# Patient Record
Sex: FEMALE | Race: WHITE | NOT HISPANIC OR LATINO | Employment: OTHER | URBAN - METROPOLITAN AREA
[De-identification: names, ages, dates, MRNs, and addresses within clinical notes are randomized per-mention and may not be internally consistent; named-entity substitution may affect disease eponyms.]

---

## 2017-09-20 LAB
CHOLEST SERPL-MSCNC: 222 MG/DL (ref 0–200)
HDLC SERPL-MCNC: 63 MG/DL
LDL/HDL RATIO: 1.9
LDLC SERPL CALC-MCNC: 121 MG/DL (ref 0–160)
TRIGLYCERIDE (LIPID PAN): 190
VLDLC SERPL-MCNC: 38 MG/DL

## 2017-12-18 ENCOUNTER — OFFICE VISIT (OUTPATIENT)
Dept: FAMILY MEDICINE | Facility: CLINIC | Age: 42
End: 2017-12-18
Payer: MEDICARE

## 2017-12-18 VITALS
DIASTOLIC BLOOD PRESSURE: 62 MMHG | BODY MASS INDEX: 22.34 KG/M2 | HEIGHT: 65 IN | WEIGHT: 134.06 LBS | OXYGEN SATURATION: 97 % | SYSTOLIC BLOOD PRESSURE: 110 MMHG | HEART RATE: 98 BPM | TEMPERATURE: 99 F

## 2017-12-18 DIAGNOSIS — G89.29 CHRONIC BACK PAIN, UNSPECIFIED BACK LOCATION, UNSPECIFIED BACK PAIN LATERALITY: Primary | ICD-10-CM

## 2017-12-18 DIAGNOSIS — M54.9 CHRONIC BACK PAIN, UNSPECIFIED BACK LOCATION, UNSPECIFIED BACK PAIN LATERALITY: Primary | ICD-10-CM

## 2017-12-18 PROCEDURE — 99999 PR PBB SHADOW E&M-NEW PATIENT-LVL IV: CPT | Mod: PBBFAC,,, | Performed by: NURSE PRACTITIONER

## 2017-12-18 PROCEDURE — 99203 OFFICE O/P NEW LOW 30 MIN: CPT | Mod: S$GLB,,, | Performed by: NURSE PRACTITIONER

## 2017-12-18 PROCEDURE — 99204 OFFICE O/P NEW MOD 45 MIN: CPT | Mod: PBBFAC,PO | Performed by: NURSE PRACTITIONER

## 2017-12-18 RX ORDER — OLANZAPINE 5 MG/1
5 TABLET ORAL NIGHTLY
COMMUNITY
End: 2018-01-30 | Stop reason: SDUPTHER

## 2017-12-18 RX ORDER — MELOXICAM 15 MG/1
15 TABLET ORAL DAILY
COMMUNITY
End: 2018-01-30 | Stop reason: SDUPTHER

## 2017-12-18 RX ORDER — BACLOFEN 20 MG/1
20 TABLET ORAL 3 TIMES DAILY
COMMUNITY
End: 2018-01-30 | Stop reason: SDUPTHER

## 2017-12-18 RX ORDER — CLONAZEPAM 0.5 MG/1
0.5 TABLET ORAL 2 TIMES DAILY PRN
COMMUNITY
End: 2018-01-30

## 2017-12-18 RX ORDER — ROSUVASTATIN CALCIUM 20 MG/1
20 TABLET, COATED ORAL DAILY
COMMUNITY
End: 2018-01-30 | Stop reason: SDUPTHER

## 2017-12-18 RX ORDER — PREGABALIN 50 MG/1
50 CAPSULE ORAL 2 TIMES DAILY
COMMUNITY
End: 2018-01-30

## 2017-12-18 RX ORDER — GABAPENTIN 300 MG/1
300 CAPSULE ORAL 3 TIMES DAILY
COMMUNITY
End: 2018-01-30 | Stop reason: SDUPTHER

## 2017-12-18 RX ORDER — VENLAFAXINE HYDROCHLORIDE 150 MG/1
75 CAPSULE, EXTENDED RELEASE ORAL 2 TIMES DAILY
COMMUNITY
End: 2018-01-30

## 2017-12-18 RX ORDER — BUPRENORPHINE 20 UG/H
20 PATCH TRANSDERMAL
COMMUNITY
End: 2018-01-30

## 2017-12-18 RX ORDER — DULOXETIN HYDROCHLORIDE 60 MG/1
60 CAPSULE, DELAYED RELEASE ORAL DAILY
COMMUNITY
End: 2018-01-30 | Stop reason: DRUGHIGH

## 2017-12-18 RX ORDER — SERTRALINE HYDROCHLORIDE 100 MG/1
100 TABLET, FILM COATED ORAL 2 TIMES DAILY
COMMUNITY
End: 2018-01-30 | Stop reason: DRUGHIGH

## 2017-12-18 NOTE — PATIENT INSTRUCTIONS
Back Pain (Acute or Chronic)    Back pain is one of the most common problems. The good news is that most people feel better in 1 to 2 weeks, and most of the rest in 1 to 2 months. Most people can remain active.  People experience and describe pain differently; not everyone is the same.  · The pain can be sharp, stabbing, shooting, aching, cramping or burning.  · Movement, standing, bending, lifting, sitting, or walking may worsen pain.  · It can be localized to one spot or area, or it can be more generalized.  · It can spread or radiate upwards, to the front, or go down your arms or legs (sciatica).  · It can cause muscle spasm.  Most of the time, mechanical problems with the muscles or spine cause the pain. Mechanical problems are usually caused by an injury to the muscles or ligaments. While illness can cause back pain, it is usually not caused by a serious illness. Mechanical problems include:   · Physical activity such as sports, exercise, work, or normal activity  · Overexertion, lifting, pushing, pulling incorrectly or too aggressively  · Sudden twisting, bending, or stretching from an accident, or accidental movement  · Poor posture  · Stretching or moving wrong, without noticing pain at the time  · Poor coordination, lack of regular exercise (check with your doctor about this)  · Spinal disc disease or arthritis  · Stress  Pain can also be related to pregnancy, or illness like appendicitis, bladder or kidney infections, pelvic infections, and many other things.  Acute back pain usually gets better in 1 to 2 weeks. Back pain related to disk disease, arthritis in the spinal joints or spinal stenosis (narrowing of the spinal canal) can become chronic and last for months or years.  Unless you had a physical injury (for example, a car accident or fall) X-rays are usually not needed for the initial evaluation of back pain. If pain continues and does not respond to medical treatment, X-rays and other tests may be  needed.  Home care  Try these home care recommendations:  · When in bed, try to find a position of comfort. A firm mattress is best. Try lying flat on your back with pillows under your knees. You can also try lying on your side with your knees bent up towards your chest and a pillow between your knees.  · At first, do not try to stretch out the sore spots. If there is a strain, it is not like the good soreness you get after exercising without an injury. In this case, stretching may make it worse.  · Avoid prolong sitting, long car rides, or travel. This puts more stress on the lower back than standing or walking.  · During the first 24 to 72 hours after an acute injury or flare up of chronic back pain, apply an ice pack to the painful area for 20 minutes and then remove it for 20 minutes. Do this over a period of 60 to 90 minutes or several times a day. This will reduce swelling and pain. Wrap the ice pack in a thin towel or plastic to protect your skin.  · You can start with ice, then switch to heat. Heat (hot shower, hot bath, or heating pad) reduces pain and works well for muscle spasms. Heat can be applied to the painful area for 20 minutes then remove it for 20 minutes. Do this over a period of 60 to 90 minutes or several times a day. Do not sleep on a heating pad. It can lead to skin burns or tissue damage.  · You can alternate ice and heat therapy. Talk with your doctor about the best treatment for your back pain.  · Therapeutic massage can help relax the back muscles without stretching them.  · Be aware of safe lifting methods and do not lift anything without stretching first.  Medicines  Talk to your doctor before using medicine, especially if you have other medical problems or are taking other medicines.  · You may use over-the-counter medicine as directed on the bottle to control pain, unless another pain medicine was prescribed. If you have chronic conditions like diabetes, liver or kidney disease,  stomach ulcers, or gastrointestinal bleeding, or are taking blood thinners, talk to your doctor before taking any medicine.  · Be careful if you are given a prescription medicines, narcotics, or medicine for muscle spasms. They can cause drowsiness, affect your coordination, reflexes, and judgement. Do not drive or operate heavy machinery.  Follow-up care  Follow up with your healthcare provider, or as advised.   A radiologist will review any X-rays that were taken. Your provide will notify you of any new findings that may affect your care.  Call 911  Call emergency services if any of the following occur:  · Trouble breathing  · Confusion  · Very drowsy or trouble awakening  · Fainting or loss of consciousness  · Rapid or very slow heart rate  · Loss of bowel or bladder control  When to seek medical advice  Call your healthcare provider right away if any of these occur:   · Pain becomes worse or spreads to your legs  · Weakness or numbness in one or both legs  · Numbness in the groin or genital area  Date Last Reviewed: 7/1/2016  © 7304-0784 [employer]. [address] [place] [address], [place] [address]. All rights reserved. This information is not intended as a substitute for professional medical care. Always follow your healthcare professional's instructions.

## 2017-12-18 NOTE — PROGRESS NOTES
Subjective:       Patient ID: Alley Chamorro is a 42 y.o. female.  First time seeing pt in clinic.  New to  [facility].   Reviewed pt's medical, surgical, social, and family history.     Chief Complaint: Establish Care (needsl referral for neurosurgen)    Pt here to establish care with a new doctor.   She was last seen by Dr Campbell in [place] and Dr Larios in [place].  She just recently moved to [place] about 2 months ago.   Pt reports, she doesn't want to go to pain management, but request a refill of her pain med patch.  She also reports she doesn't want to have anymore steroid injections.             Vitals:    12/18/17 0850   BP: 110/62   Pulse: 98   Temp: 98.6 °F (37 °C)     Past Medical History:   Diagnosis Date    Anxiety     Chronic back pain     Depression     Rheumatoid arthritis     Scoliosis      Review of Systems   Constitutional: Negative for chills, diaphoresis and fever.   HENT: Negative for congestion, ear discharge, ear pain, postnasal drip, rhinorrhea, sinus pain, sinus pressure, sneezing, sore throat and voice change.    Eyes: Negative for visual disturbance.   Respiratory: Negative for cough, chest tightness and shortness of breath.    Cardiovascular: Negative for chest pain.   Gastrointestinal: Negative for abdominal pain, constipation, diarrhea, nausea and vomiting.   Genitourinary: Negative for difficulty urinating, dysuria, frequency, hematuria and urgency.   Musculoskeletal: Positive for back pain.        Chronic back pain since 2006. 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 She reports multiple bulging disc hx.        Objective:      Physical Exam   Constitutional: She is oriented to person, place, and time. Vital signs are normal. She appears well-developed and well-nourished. She is cooperative.   HENT:   Head: Normocephalic and atraumatic.   Right Ear: Hearing, tympanic membrane, external ear and ear canal normal.   Left Ear: Hearing, tympanic membrane,  external ear and ear canal normal.   Nose: Nose normal.   Mouth/Throat: Uvula is midline, oropharynx is clear and moist and mucous membranes are normal. Abnormal dentition. Dental caries present.   Eyes: Conjunctivae, EOM and lids are normal. Pupils are equal, round, and reactive to light.   Neck: Trachea normal and normal range of motion. Neck supple.   Cardiovascular: Normal rate, regular rhythm, S1 normal, S2 normal, normal heart sounds, intact distal pulses and normal pulses.    Pulses:       Posterior tibial pulses are 2+ on the right side, and 2+ on the left side.   Pulmonary/Chest: Effort normal and breath sounds normal. No respiratory distress.   Abdominal: Soft. Bowel sounds are normal. There is no tenderness. There is no tenderness at McBurney's point and negative Yap's sign.   Musculoskeletal:        Lumbar back: She exhibits tenderness.   Lymphadenopathy:        Head (right side): No submental, no submandibular, no tonsillar, no preauricular, no posterior auricular and no occipital adenopathy present.        Head (left side): No submental, no submandibular, no tonsillar, no preauricular, no posterior auricular and no occipital adenopathy present.     She has no cervical adenopathy.   Neurological: She is alert and oriented to person, place, and time.   Skin: Skin is warm, dry and intact.   Psychiatric: She has a normal mood and affect. Her speech is normal and behavior is normal. Judgment and thought content normal. Cognition and memory are normal.   Nursing note and vitals reviewed.      Assessment & Plan:       Chronic back pain, unspecified back location, unspecified back pain laterality    Pt reports her last MRI was within a year. Pt signed medical release, to get a copy of her records from previous providers.   Pt to schedule appointment to establish care with a PCP for further evaluation and treatment for chronic back pain.   Pt declined pain management at this time.       Return if symptoms  worsen or fail to improve.

## 2018-01-16 ENCOUNTER — PATIENT OUTREACH (OUTPATIENT)
Dept: ADMINISTRATIVE | Facility: HOSPITAL | Age: 43
End: 2018-01-16

## 2018-01-16 NOTE — LETTER
January 16, 2018    Alley Chamorro  [address] [place] [address]             [facility]  [address]  [facility] [address]  Phone: [phone] Dear Mrs. Chamorro:    [facility] is committed to your overall health.  To help you get the most out of each of your visits, we will review your information to make sure you are up to date on all of your recommended tests and/or procedures.      As a new patient to Dr. Renate De La O, we may not have your complete medical records.  She has found that your chart shows you may be due for a mammogram and possibly some immunizations (tetanus, pneumonia, and flu).     Medicare does not cover all immunizations to be given in the clinic.  Check your benefits to ensure that you do not need to receive your immunizations at the pharmacy.    If you have had any of the above done at another facility, please bring the records or information with you so that your record at [facility] will be complete.      If you are currently taking medication, please bring it with you to your appointment for review.    If you have any questions or concerns, please don't hesitate to call.    Thank you for letting us care for you,  Katharine Ruffin LPN Clinical Care Coordinator  [facility]  [phone]

## 2018-01-30 ENCOUNTER — OFFICE VISIT (OUTPATIENT)
Dept: FAMILY MEDICINE | Facility: CLINIC | Age: 43
End: 2018-01-30
Payer: MEDICARE

## 2018-01-30 VITALS
SYSTOLIC BLOOD PRESSURE: 112 MMHG | RESPIRATION RATE: 16 BRPM | WEIGHT: 133.81 LBS | TEMPERATURE: 99 F | HEIGHT: 65 IN | HEART RATE: 90 BPM | BODY MASS INDEX: 22.3 KG/M2 | DIASTOLIC BLOOD PRESSURE: 82 MMHG | OXYGEN SATURATION: 98 %

## 2018-01-30 DIAGNOSIS — M54.42 CHRONIC BILATERAL LOW BACK PAIN WITH BILATERAL SCIATICA: ICD-10-CM

## 2018-01-30 DIAGNOSIS — Z12.31 ENCOUNTER FOR SCREENING MAMMOGRAM FOR MALIGNANT NEOPLASM OF BREAST: ICD-10-CM

## 2018-01-30 DIAGNOSIS — Z11.59 ENCOUNTER FOR HEPATITIS C SCREENING TEST FOR LOW RISK PATIENT: ICD-10-CM

## 2018-01-30 DIAGNOSIS — Z87.39 HISTORY OF ARTHRITIS: ICD-10-CM

## 2018-01-30 DIAGNOSIS — M25.50 ARTHRALGIA, UNSPECIFIED JOINT: ICD-10-CM

## 2018-01-30 DIAGNOSIS — F12.90 MARIJUANA USE: ICD-10-CM

## 2018-01-30 DIAGNOSIS — G89.29 CHRONIC NECK PAIN: ICD-10-CM

## 2018-01-30 DIAGNOSIS — M54.2 CHRONIC NECK PAIN: ICD-10-CM

## 2018-01-30 DIAGNOSIS — Z72.0 TOBACCO ABUSE: ICD-10-CM

## 2018-01-30 DIAGNOSIS — E78.5 HYPERLIPIDEMIA, UNSPECIFIED HYPERLIPIDEMIA TYPE: ICD-10-CM

## 2018-01-30 DIAGNOSIS — Z23 NEED FOR PNEUMOCOCCAL VACCINATION: ICD-10-CM

## 2018-01-30 DIAGNOSIS — F33.1 MODERATE EPISODE OF RECURRENT MAJOR DEPRESSIVE DISORDER: Primary | ICD-10-CM

## 2018-01-30 DIAGNOSIS — G89.29 CHRONIC BILATERAL LOW BACK PAIN WITH BILATERAL SCIATICA: ICD-10-CM

## 2018-01-30 DIAGNOSIS — Z23 NEED FOR INFLUENZA VACCINATION: ICD-10-CM

## 2018-01-30 DIAGNOSIS — M54.41 CHRONIC BILATERAL LOW BACK PAIN WITH BILATERAL SCIATICA: ICD-10-CM

## 2018-01-30 DIAGNOSIS — M25.521 RIGHT ELBOW PAIN: ICD-10-CM

## 2018-01-30 PROCEDURE — 90686 IIV4 VACC NO PRSV 0.5 ML IM: CPT | Mod: S$GLB,,, | Performed by: INTERNAL MEDICINE

## 2018-01-30 PROCEDURE — G0008 ADMIN INFLUENZA VIRUS VAC: HCPCS | Mod: S$GLB,,, | Performed by: INTERNAL MEDICINE

## 2018-01-30 PROCEDURE — G0009 ADMIN PNEUMOCOCCAL VACCINE: HCPCS | Mod: S$GLB,,, | Performed by: INTERNAL MEDICINE

## 2018-01-30 PROCEDURE — 99204 OFFICE O/P NEW MOD 45 MIN: CPT | Mod: S$GLB,,, | Performed by: INTERNAL MEDICINE

## 2018-01-30 PROCEDURE — 90732 PPSV23 VACC 2 YRS+ SUBQ/IM: CPT | Mod: S$GLB,,, | Performed by: INTERNAL MEDICINE

## 2018-01-30 RX ORDER — BACLOFEN 20 MG/1
10 TABLET ORAL 3 TIMES DAILY
Qty: 90 TABLET | Refills: 3 | Status: SHIPPED | OUTPATIENT
Start: 2018-01-30 | End: 2018-03-08 | Stop reason: DRUGHIGH

## 2018-01-30 RX ORDER — OLANZAPINE 5 MG/1
5 TABLET ORAL NIGHTLY
Qty: 30 TABLET | Refills: 3 | Status: SHIPPED | OUTPATIENT
Start: 2018-01-30 | End: 2018-03-08

## 2018-01-30 RX ORDER — DULOXETIN HYDROCHLORIDE 30 MG/1
CAPSULE, DELAYED RELEASE ORAL
Qty: 70 CAPSULE | Refills: 0 | Status: SHIPPED | OUTPATIENT
Start: 2018-01-30 | End: 2018-03-08 | Stop reason: DRUGHIGH

## 2018-01-30 RX ORDER — GABAPENTIN 300 MG/1
300 CAPSULE ORAL 3 TIMES DAILY
Qty: 90 CAPSULE | Refills: 3 | Status: SHIPPED | OUTPATIENT
Start: 2018-01-30

## 2018-01-30 RX ORDER — MELOXICAM 15 MG/1
15 TABLET ORAL DAILY
Qty: 30 TABLET | Refills: 6 | Status: SHIPPED | OUTPATIENT
Start: 2018-01-30 | End: 2018-10-10 | Stop reason: SDUPTHER

## 2018-01-30 RX ORDER — DULOXETIN HYDROCHLORIDE 60 MG/1
60 CAPSULE, DELAYED RELEASE ORAL 2 TIMES DAILY
Qty: 60 CAPSULE | Refills: 3 | Status: SHIPPED | OUTPATIENT
Start: 2018-01-30 | End: 2018-08-23 | Stop reason: SDUPTHER

## 2018-01-30 RX ORDER — ROSUVASTATIN CALCIUM 20 MG/1
20 TABLET, COATED ORAL DAILY
Qty: 90 TABLET | Refills: 3 | Status: SHIPPED | OUTPATIENT
Start: 2018-01-30 | End: 2018-02-09

## 2018-01-30 RX ORDER — DULOXETIN HYDROCHLORIDE 60 MG/1
60 CAPSULE, DELAYED RELEASE ORAL 2 TIMES DAILY
COMMUNITY
End: 2018-01-30

## 2018-01-30 RX ORDER — SERTRALINE HYDROCHLORIDE 100 MG/1
100 TABLET, FILM COATED ORAL
COMMUNITY
End: 2018-01-30

## 2018-01-30 NOTE — PROGRESS NOTES
Subjective:       Patient ID: Alley Chamorro is a 43 y.o. female.    Medication List with Changes/Refills   New Medications    DULOXETINE (CYMBALTA) 30 MG CAPSULE    Take one tablet daily x 1 week then take 1 tablet bid x 1 week then take 2 tablets qam and 1 tablet qpm x 1 week then take 1 tablet bid   Changed and/or Refilled Medications    Modified Medication Previous Medication    BACLOFEN (LIORESAL) 20 MG TABLET baclofen (LIORESAL) 20 MG tablet       Take 0.5 tablets (10 mg total) by mouth 3 (three) times daily.    Take 20 mg by mouth 3 (three) times daily.    DULOXETINE (CYMBALTA) 60 MG CAPSULE DULoxetine (CYMBALTA) 60 MG capsule       Take 1 capsule (60 mg total) by mouth 2 (two) times daily.    Take 60 mg by mouth 2 (two) times daily.    GABAPENTIN (NEURONTIN) 300 MG CAPSULE gabapentin (NEURONTIN) 300 MG capsule       Take 1 capsule (300 mg total) by mouth 3 (three) times daily.    Take 300 mg by mouth 3 (three) times daily.    MELOXICAM (MOBIC) 15 MG TABLET meloxicam (MOBIC) 15 MG tablet       Take 1 tablet (15 mg total) by mouth once daily.    Take 15 mg by mouth once daily.    OLANZAPINE (ZYPREXA) 5 MG TABLET OLANZapine (ZYPREXA) 5 MG tablet       Take 1 tablet (5 mg total) by mouth every evening.    Take 5 mg by mouth every evening.    ROSUVASTATIN (CRESTOR) 20 MG TABLET rosuvastatin (CRESTOR) 20 MG tablet       Take 1 tablet (20 mg total) by mouth once daily.    Take 20 mg by mouth once daily.   Discontinued Medications    BUPRENORPHINE (BUTRANS) 20 MCG/HOUR PTWK    Place 20 mg onto the skin.    CLONAZEPAM (KLONOPIN) 0.5 MG TABLET    Take 0.5 mg by mouth 2 (two) times daily as needed for Anxiety.    DULOXETINE (CYMBALTA) 60 MG CAPSULE    Take 60 mg by mouth once daily.    PREGABALIN (LYRICA) 50 MG CAPSULE    Take 50 mg by mouth 2 (two) times daily.    SERTRALINE (ZOLOFT) 100 MG TABLET    Take 100 mg by mouth 2 (two) times daily.    SERTRALINE (ZOLOFT) 100 MG TABLET    Take 100 mg by mouth. Take 2  "tablets every morning.    VENLAFAXINE (EFFEXOR-XR) 150 MG CP24    Take 75 mg by mouth 2 (two) times daily.       Chief Complaint: Establish Care (recently moved from [place]) and Fall (on Roby day  / went to ER [facility] / right arm pain )  She is a new patient here today to establish care.  She brought no records. She has moved multiple times in multiple states over the last couple years.  She has been out of all her medications for over one month.      She has hyperlipidemia and is taking atorvastatin 20 mg qday. She has no known CAD.  She denies chest pain or shortness of breath.     She has depression for many years.  She has had 2 "nervous breakdowns" in her life and one required a week hospitalization.  She reports anxiety but denies suicidal ideations. Per patient she was taking zoloft 200 mg qday, cymbalta 60 mg bid, zyprexa 5 mg qhs, klonopin 0.5 mg bid to control her symptoms.  She was managed by her primary doctor and has never seen psychiatry.      She reports chronic low back and neck pain for many years. She has been on disability for her low back since 2007. She denies any falls or injuries.  She reports that she has had recent imaging about 1 1/2 years ago and was told she should be seen/followed by a neurosurgeon.  She describes her pain in low back as constant rated 7/10 on a good day and >10/10 on a bad day.  Worse with activity, bending and lifting. Better with "nothing".  She says pain does travel to her hips and travels up her back. She says she has severe scoliosis.  She describes her neck pain as intermittent with muscle spasms that cause headaches.  No radiation.  Worse with "anything". Better with "nothing".  She was taking gabapentin 300 mg tid, lyrica 50 mg bid, baclofen 20 mg tid, mobic 15 mg qday and butrans patch.  She has been out of all this medication. She will use marijuana to help with her pain.  She has seen pain management in the past but she does not want any "shots". 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 She said her symptoms were leg pain.  She says she still has joint pain in her elbows, hips and hands. She denies swelling, redness or warmth. She has never taken medication for her arthritis.     She reports a fall on [place] and hit her left knee and right elbow. She says her knee is better but on 1/3/2018 she had another fall on her right elbow. She was seen in the ER at [facility] on 1/4/2018 with xrays that were negative for fx.  She complains of continue pain in the elbow and trouble straightening her arm.  She says it is swollen and not getting any better.      She is a heavy smoker and smokes one pack a day for over 30 years. She is thinking about quitting. She does use marijuana for pain.  She does not exercise. She does not eat healthy.      Mammogram----2 years  Pap-----GEOFF  Tdap---more than 10 years  Influenza vaccine---none  Pneumovax 23----none    Review of Systems   Constitutional: Positive for fatigue. Negative for appetite change, fever and unexpected weight change.   HENT: Negative for congestion, ear pain, hearing loss, sore throat and trouble swallowing.    Eyes: Negative for pain and visual disturbance.   Respiratory: Negative for cough, chest tightness, shortness of breath and wheezing.    Cardiovascular: Positive for leg swelling. Negative for chest pain and palpitations.   Gastrointestinal: Negative for abdominal pain, blood in stool, constipation, diarrhea, nausea and vomiting.   Endocrine: Negative for polyuria.   Genitourinary: Negative for dysuria and hematuria.   Musculoskeletal: Positive for arthralgias and back pain. Negative for myalgias.   Skin: Negative for rash.   Neurological: Negative for dizziness, weakness, numbness and headaches.   Hematological: Does not bruise/bleed easily.   Psychiatric/Behavioral: Positive for dysphoric mood and sleep disturbance. Negative for suicidal ideas. The patient is  "nervous/anxious.        Objective:      Vitals:    01/30/18 0814   BP: 112/82   Pulse: 90   Resp: 16   Temp: 98.5 °F (36.9 °C)   TempSrc: Oral   SpO2: 98%   Weight: 60.7 kg (133 lb 13.1 oz)   Height: 5' 5" (1.651 m)     Body mass index is 22.27 kg/m².  Physical Exam    General appearance: No acute distress, cooperative  Eyes: PERRL, EOMI, conjunctiva clear  Ears: normal external ear and pinna, tm clear without drainage, canals clear  Nose: Normal mucosa without drainage  Throat: no exudates or erythema, tonsils not enlarged  Mouth: no sores or lesions, moist mucous membranes, very poor dentition  Neck: FROM, soft, supple, no thyromegaly, no bruits  Lymph: no anterior or posterior cervical adenopathy  Heart::  Regular rate and rhythm, no murmur  Lung: Clear to ascultation bilaterally, no wheezing, no rales, no rhonchi, no distress  Abdomen: Soft, nontender, no distention, no hepatosplenomegaly, bowel sounds normal, no guarding, no rebound, no peritoneal signs  Skin: no rashes, no lesions  Extremities: no edema, no cyanosis  Neuro: CN 2-12 intact, 5/5 muscle strength upper and lower extremity bilaterally, 2+ DTRs UE and 3+ DRTs LE bilaterally, normal gait, normal sensation  Peripheral pulses: 2+ pedal pulses bilaterally, good perfusion and color  Musculoskeletal: FROM, good strenth, no tenderness  Joint: normal appearance, no swelling, no warmth, no deformity in all joints  Right elbow: FROM, tenderness on palpation of the lateral epicondyle.  No warmth    Assessment:       1. Moderate episode of recurrent major depressive disorder    2. Hyperlipidemia, unspecified hyperlipidemia type    3. Chronic bilateral low back pain with bilateral sciatica    4. Chronic neck pain    5. Tobacco abuse    6. Marijuana use    7. Right elbow pain    8. History of arthritis    9. Arthralgia, unspecified joint    10. Encounter for hepatitis C screening test for low risk patient    11. Encounter for screening mammogram for malignant " neoplasm of breast    12. Need for pneumococcal vaccination    13. Need for influenza vaccination        Plan:       Moderate episode of recurrent major depressive disorder  Uncontrolled and she is on a regimen with high dose SSRI and SNRI.  I am not going to restart her zoloft. I will restart her cymbalta increasing by one dose every week to a goal of 60 mg bid.  I will continue her zyprexa at night.  I will refer her to psychiatry for management and diagnosis.  I am not willing to rx klonopin.    -     CBC auto differential; Future; Expected date: 01/30/2018  -     TSH; Future; Expected date: 01/30/2018  -     OLANZapine (ZYPREXA) 5 MG tablet; Take 1 tablet (5 mg total) by mouth every evening.  Dispense: 30 tablet; Refill: 3  -     Ambulatory referral to Psychiatry    Hyperlipidemia, unspecified hyperlipidemia type  Continue atorvastatin and will recheck lipids.    -     Comprehensive metabolic panel; Future; Expected date: 01/30/2018  -     Lipid panel; Future; Expected date: 01/30/2018  -     rosuvastatin (CRESTOR) 20 MG tablet; Take 1 tablet (20 mg total) by mouth once daily.  Dispense: 90 tablet; Refill: 3    Chronic bilateral low back pain with bilateral sciatica with neck pain  Uncontrolled. She did not bring any records and I am concerned about her treatment regimen.  I advised her that I do not manage chronic pain and recommended a referral to pain clinic.  She will need to get a copy of her records first.  I will restart her cymbalta and slowing start her gabapentin increasing her dose every 4 days to a goal of 300 mg tid. I will restart her baclofen and mobic.  I am not going to give her lyrica or opioid pain medication.  I offered PT but she says she has already done this in the past.  I feel she was upset that I would not give her pain medication today.  She continues to use marijuana.    -     baclofen (LIORESAL) 20 MG tablet; Take 0.5 tablets (10 mg total) by mouth 3 (three) times daily.  Dispense:  90 tablet; Refill: 3  -     DULoxetine (CYMBALTA) 30 MG capsule; Take one tablet daily x 1 week then take 1 tablet bid x 1 week then take 2 tablets qam and 1 tablet qpm x 1 week then take 1 tablet bid  Dispense: 70 capsule; Refill: 0  -     DULoxetine (CYMBALTA) 60 MG capsule; Take 1 capsule (60 mg total) by mouth 2 (two) times daily.  Dispense: 60 capsule; Refill: 3  -     meloxicam (MOBIC) 15 MG tablet; Take 1 tablet (15 mg total) by mouth once daily.  Dispense: 30 tablet; Refill: 6  -     gabapentin (NEURONTIN) 300 MG capsule; Take 1 capsule (300 mg total) by mouth 3 (three) times daily.  Dispense: 90 capsule; Refill: 3    Tobacco abuse  STrongly advised to stop smoking.     Marijuana use  She continues to use for pain control.  I explained she can not use marijuana and be on other controlled substances.     Right elbow pain  Due to 2 recent falls. Will get xray and refer to orthopedics.  -     X-Ray Elbow Complete Right; Future; Expected date: 01/30/2018  -     Ambulatory referral to Orthopedics    History of arthritis with Arthralgia, unspecified joint  Joint exam normal today.  ? History of juvenile RA. Will check labs because history is unclear.    -     Rheumatoid factor; Future; Expected date: 01/30/2018  -     Sedimentation rate, manual; Future; Expected date: 01/30/2018  -     C-reactive protein; Future; Expected date: 01/30/2018    Encounter for hepatitis C screening test for low risk patient  -     Hepatitis C antibody; Future; Expected date: 01/30/2018    Encounter for screening mammogram for malignant neoplasm of breast  -     Mammo Digital Screening Bilat with CAD; Future; Expected date: 01/30/2018    Need for pneumococcal vaccination  -     Pneumococcal Polysaccharide Vaccine (23 Valent) (SQ/IM)    Need for influenza vaccination  -     Influenza - Quadrivalent (3 years & older) (PF)    Follow-up in about 6 weeks (around 3/13/2018) for recheck depression and labs.

## 2018-01-30 NOTE — PATIENT INSTRUCTIONS
Gabapentin 300 mg  Take one tablet at night for 4 days then increase to 1 tablet twice a day for 4 days then 1 tablet three times a day    Cymbalta 30 mg (duloxetine)  Take one tablet a day for 1 week  Then 1 tablet twice a day for 1 week  Then 2 tablets in am and 1 tablet in the pm for 1 week  Then 2 tablets twice a day    Then take new prescription for cymbalta 60 mg to take one tablet twice a day

## 2018-02-08 ENCOUNTER — OFFICE VISIT (OUTPATIENT)
Dept: ORTHOPEDICS | Facility: CLINIC | Age: 43
End: 2018-02-08
Payer: MEDICARE

## 2018-02-08 ENCOUNTER — HOSPITAL ENCOUNTER (OUTPATIENT)
Dept: RADIOLOGY | Facility: HOSPITAL | Age: 43
Discharge: HOME | End: 2018-02-08
Attending: INTERNAL MEDICINE
Payer: MEDICARE

## 2018-02-08 VITALS — BODY MASS INDEX: 22.16 KG/M2 | WEIGHT: 133 LBS | HEIGHT: 65 IN

## 2018-02-08 DIAGNOSIS — S50.01XA CONTUSION OF RIGHT ELBOW, INITIAL ENCOUNTER: Primary | ICD-10-CM

## 2018-02-08 DIAGNOSIS — M25.521 RIGHT ELBOW PAIN: ICD-10-CM

## 2018-02-08 PROCEDURE — 73080 X-RAY EXAM OF ELBOW: CPT | Mod: TC,PO,RT

## 2018-02-08 PROCEDURE — 73080 X-RAY EXAM OF ELBOW: CPT | Mod: 26,RT,, | Performed by: RADIOLOGY

## 2018-02-08 PROCEDURE — 99203 OFFICE O/P NEW LOW 30 MIN: CPT | Mod: S$GLB,,, | Performed by: ORTHOPAEDIC SURGERY

## 2018-02-08 PROCEDURE — 99212 OFFICE O/P EST SF 10 MIN: CPT | Mod: PBBFAC,25,PN | Performed by: ORTHOPAEDIC SURGERY

## 2018-02-08 PROCEDURE — 99999 PR PBB SHADOW E&M-EST. PATIENT-LVL II: CPT | Mod: PBBFAC,,, | Performed by: ORTHOPAEDIC SURGERY

## 2018-02-08 NOTE — LETTER
February 8, 2018      Renate De La O DO  [address] [address]  [address]  [facility] [address]           [facility]  [address] [facility] [address] [place] [address]  Phone: [phone]          Patient: Alley Chamorro   MR Number: [id number]   YOB: 1975   Date of Visit: 2/8/2018       Dear Dr. Renate De La O:    Thank you for referring Alley Chamorro to me for evaluation. Attached you will find relevant portions of my assessment and plan of care.    If you have questions, please do not hesitate to call me. I look forward to following Alley Chamorro along with you.    Sincerely,    Ruel López MD    Enclosure  CC:  No Recipients    If you would like to receive this communication electronically, please contact [email or web address] or [phone] to request more information on [employer] Link access.    For providers and/or their staff who would like to refer a patient to [facility], please contact us through our one-stop-shop provider referral line, Ruth Moe, at [phone].    If you feel you have received this communication in error or would no longer like to receive these types of communications, please e-mail [email or web address]

## 2018-02-08 NOTE — PROGRESS NOTES
"DATE: 2/8/2018  PATIENT: Alley Chamorro  REFERRING MD:   CHIEF COMPLAINT:   Chief Complaint   Patient presents with    Right Elbow - Pain       HISTORY:  Alley Chamorro is a 43 y.o. right hand dominant female  who presents for initial evaluation of right elbow injury.  States she had 2 falls 1 on December 25 in the other in January 3 which she landed directly on her elbow.  She had pain.  She noted immediate discomfort and mild swelling.  She is using ice every night.  She states that her elbow is not getting better is reported to be 10/10 regarding pain.  She denies any previous elbow injuries.  She is referred by her primary care physician Dr. De La O for evaluation    PAST MEDICAL/SURGICAL HISTORY:  Past Medical History:   Diagnosis Date    Anxiety     Chronic back pain     Depression     Hyperlipidemia     Rheumatoid arthritis     Scoliosis      Past Surgical History:   Procedure Laterality Date    HYSTERECTOMY  age 23    endometriosis, left oopherectomy, 2015 right oopherectomy due to cysts    LEG SURGERY Left     congenital deformity done age 18 months    SHOULDER SURGERY Bilateral     "I could not use my arm", "something was out of place"       Current Medications:   Current Outpatient Prescriptions:     baclofen (LIORESAL) 20 MG tablet, Take 0.5 tablets (10 mg total) by mouth 3 (three) times daily., Disp: 90 tablet, Rfl: 3    DULoxetine (CYMBALTA) 30 MG capsule, Take one tablet daily x 1 week then take 1 tablet bid x 1 week then take 2 tablets qam and 1 tablet qpm x 1 week then take 1 tablet bid, Disp: 70 capsule, Rfl: 0    DULoxetine (CYMBALTA) 60 MG capsule, Take 1 capsule (60 mg total) by mouth 2 (two) times daily., Disp: 60 capsule, Rfl: 3    gabapentin (NEURONTIN) 300 MG capsule, Take 1 capsule (300 mg total) by mouth 3 (three) times daily., Disp: 90 capsule, Rfl: 3    meloxicam (MOBIC) 15 MG tablet, Take 1 tablet (15 mg total) by mouth once daily., Disp: 30 tablet, Rfl: 6    " "OLANZapine (ZYPREXA) 5 MG tablet, Take 1 tablet (5 mg total) by mouth every evening., Disp: 30 tablet, Rfl: 3    rosuvastatin (CRESTOR) 20 MG tablet, Take 1 tablet (20 mg total) by mouth once daily., Disp: 90 tablet, Rfl: 3    Family History: family history was reviewed and is noncontributory  Social History:   Social History     Social History    Marital status: Legally      Spouse name: N/A    Number of children: N/A    Years of education: N/A     Occupational History    disability due to low back      Social History Main Topics    Smoking status: Current Every Day Smoker     Packs/day: 1.00     Years: 30.00     Types: Cigarettes    Smokeless tobacco: Never Used    Alcohol use 1.2 oz/week     2 Shots of liquor per week    Drug use: Yes     Types: Marijuana      Comment: for pain    Sexual activity: Yes     Partners: Male     Other Topics Concern    Not on file     Social History Narrative    No narrative on file       ROS:  Constitution: Negative for chills, fever, and sweats. Negative for unexplained weight loss.  HENT: Negative for headaches and blurry vision.   Cardiovascular: Negative for chest pain, irregular heartbeat, leg swelling and palpitations.   Respiratory: Negative for cough and shortness of breath.   Gastrointestinal: Negative for abdominal pain, heartburn, nausea and vomiting.   Genitourinary: Negative for bladder incontinence and dysuria.   Musculoskeletal: Negative for systemic arthritis, joint swelling, muscle weakness and myalgias.   Neurological: Negative for numbness.   Psychiatric/Behavioral: Negative for depression.   Endocrine: Negative for polyuria.   Hematologic/Lymphatic: Negative for bleeding disorders.  Skin: Negative for poor wound healing.       PHYSICAL EXAM:  Ht 5' 5" (1.651 m)   Wt 60.3 kg (133 lb)   BMI 22.13 kg/m²   Middle-age female appearing slightly older than her stated age in no acute distress.  Examination right elbow reveals no ecchymosis, swelling " or effusion.  Tenderness over the radial head.  Range of motion although comfortable is full to flexion and extension.  Supination to 80°, pronation 60°.  Sensation intact to the right upper extremity.  No crepitus or click noted with range of motion.      IMAGING:   X-rays the right elbow are personally reviewed.  No acute fractures or dislocations are seen.  Specifically no fractures about the radial head and neck noted.     ASSESSMENT:   Contusion right elbow, DOI 1/3/18    PLAN:  The nature of the diagnosis, using models and diagrams when appropriate, was explained to the patient in detail.Treatment option discussed included observation, physical therapy, MRI to rule out an occult radial head fracture.. All questions answered and .      T we've agreed on observation at the present time.  She'll monitor her symptoms over the next 3 weeks.  Follow-up in 3 weeks' time for reexam.  Should she still have significant symptoms consideration for MRI will be discussed    This note was dictated using voice recognition software. Please excuse any grammatical or typographical errors.

## 2018-02-09 ENCOUNTER — TELEPHONE (OUTPATIENT)
Dept: FAMILY MEDICINE | Facility: CLINIC | Age: 43
End: 2018-02-09

## 2018-02-09 DIAGNOSIS — E78.5 HYPERLIPIDEMIA, UNSPECIFIED HYPERLIPIDEMIA TYPE: Primary | ICD-10-CM

## 2018-02-09 RX ORDER — ROSUVASTATIN CALCIUM 40 MG/1
40 TABLET, COATED ORAL NIGHTLY
Qty: 90 TABLET | Refills: 3 | Status: SHIPPED | OUTPATIENT
Start: 2018-02-09 | End: 2019-02-09

## 2018-02-09 NOTE — TELEPHONE ENCOUNTER
Please let her know that her liver,kidney and thyroid look good. No evidence of rheumatoid arthritis.   Her cholesterol is quite elevated and I want to increase her crestor to 40 mg qday. I am sending rx to pharm.     She can then have lipids rechecked in 3months    thanks

## 2018-02-15 ENCOUNTER — TELEPHONE (OUTPATIENT)
Dept: FAMILY MEDICINE | Facility: CLINIC | Age: 43
End: 2018-02-15

## 2018-02-15 DIAGNOSIS — M54.12 CERVICAL RADICULOPATHY: Primary | ICD-10-CM

## 2018-02-15 DIAGNOSIS — M47.26 OSTEOARTHRITIS OF SPINE WITH RADICULOPATHY, LUMBAR REGION: ICD-10-CM

## 2018-02-15 DIAGNOSIS — G93.5 CHIARI MALFORMATION TYPE I: ICD-10-CM

## 2018-02-15 NOTE — TELEPHONE ENCOUNTER
----- Message from RT Sara sent at 2/15/2018  9:10 AM CST -----  Contact: pt    pt , requesting a call back soon seeking medical advise to possibly see a neurosurgeon, thanks.

## 2018-02-15 NOTE — TELEPHONE ENCOUNTER
Pt requesting neurosurgeon referral.  Said her scoleosis is given her difficulties with walking, sitting and standing.  She said she does not have a life.  She said she sometimes have trouble wiping in restroom due to her back pain.  She said she has rheumotoid arthritis down back and radiating down her legs.  She said in the past her neurologist recommend her to see neuro surgeon.   , Dr Adame ([place], [phone]) said she signed JOSUÉ at time of visit, 01/30/18She said she is ready now.

## 2018-02-20 NOTE — TELEPHONE ENCOUNTER
Spoke to patient regarding results and increased medication dosage, patient verbalized understanding. Lab recheck scheduled, patient aware of date/time.

## 2018-02-21 ENCOUNTER — TELEPHONE (OUTPATIENT)
Dept: SPINE | Facility: CLINIC | Age: 43
End: 2018-02-21

## 2018-02-21 NOTE — TELEPHONE ENCOUNTER
Spoke with patient and scheduled an appointment for her to see Shaina Contreras 2-26-18, she indicated understanding.

## 2018-02-21 NOTE — TELEPHONE ENCOUNTER
I have received her medical records that show cervical disc disease without any stenosis.  She had an MRI of lumbar spine in 2016 that showed disc bulge of L5-S1 with b/l moderate foraminal stenosis at L4-L5.      She has been seen by NS  In [place] with the diagnosis of cervical radiculopathy, carpal tunnel, and Arnold-Chiari malformation I.    I will make the referral but she has to come by the office to pickup her records to take with her to that appt.     thanks

## 2018-02-21 NOTE — TELEPHONE ENCOUNTER
Spoke to pt. Informed her the NS referral is in and she will have to  her records to take with her.   Referral and records upfront ready for pickup.   I was unable to nisha the pt with NS, gave pt phone number to call and schedule.     Routed referral to neurosurgeon department.

## 2018-02-21 NOTE — TELEPHONE ENCOUNTER
----- Message from Shaina Contreras PA-C sent at 2/21/2018  1:07 PM CST -----  Please ararnge for her to be seen in clinic.    Thanks -     Shaina    ----- Message -----  From: Jeaneth Joy LPN  Sent: 2/21/2018   8:31 AM  To: Shaina Contreras PA-C    Please call patient and schedule appointment from referral with next available neurosurgeon available.   I was unable to schedule the patient myself.  Patient has her records with her.

## 2018-02-22 ENCOUNTER — PATIENT OUTREACH (OUTPATIENT)
Dept: ADMINISTRATIVE | Facility: HOSPITAL | Age: 43
End: 2018-02-22

## 2018-02-22 NOTE — LETTER
February 22, 2018    Alley Chamorro  [address] [place] [address]             [facility]  [address]  [place] [address]  Phone: [phone] Dear Mrs. Chamorro:    [facility] is committed to your overall health.  To help you get the most out of each of your visits, we will review your information to make sure you are up to date on all of your recommended tests and/or procedures.      Dr. Renate De La O has found that your chart shows you may be due for a mammogram and a tetanus immunization.     Medicare does not cover all immunizations to be given in the clinic.  Check your benefits to ensure that you do not need to receive your immunizations at the pharmacy.    If you have had any of the above done at another facility, please bring the records or information with you so that your record at [facility] will be complete.  If you would like to schedule any of these, please contact me.    If you are currently taking medication, please bring it with you to your appointment for review.    If you have any questions or concerns, please don't hesitate to call.    Thank you for letting us care for you,  Katharine Ruffin LPN Clinical Care Coordinator  [facility] [place]  [phone]

## 2018-02-26 ENCOUNTER — TELEPHONE (OUTPATIENT)
Dept: FAMILY MEDICINE | Facility: CLINIC | Age: 43
End: 2018-02-26

## 2018-02-26 ENCOUNTER — OFFICE VISIT (OUTPATIENT)
Dept: SPINE | Facility: CLINIC | Age: 43
End: 2018-02-26
Payer: MEDICARE

## 2018-02-26 VITALS
BODY MASS INDEX: 22.15 KG/M2 | HEIGHT: 65 IN | DIASTOLIC BLOOD PRESSURE: 58 MMHG | HEART RATE: 80 BPM | SYSTOLIC BLOOD PRESSURE: 112 MMHG | WEIGHT: 132.94 LBS

## 2018-02-26 DIAGNOSIS — M41.9 SCOLIOSIS, UNSPECIFIED SCOLIOSIS TYPE, UNSPECIFIED SPINAL REGION: ICD-10-CM

## 2018-02-26 DIAGNOSIS — M54.42 CHRONIC BILATERAL LOW BACK PAIN WITH BILATERAL SCIATICA: Primary | ICD-10-CM

## 2018-02-26 DIAGNOSIS — M47.816 LUMBAR SPONDYLOSIS: ICD-10-CM

## 2018-02-26 DIAGNOSIS — F33.1 MODERATE EPISODE OF RECURRENT MAJOR DEPRESSIVE DISORDER: ICD-10-CM

## 2018-02-26 DIAGNOSIS — M54.41 CHRONIC BILATERAL LOW BACK PAIN WITH BILATERAL SCIATICA: Primary | ICD-10-CM

## 2018-02-26 DIAGNOSIS — G89.29 CHRONIC BILATERAL LOW BACK PAIN WITH BILATERAL SCIATICA: Primary | ICD-10-CM

## 2018-02-26 PROCEDURE — 99203 OFFICE O/P NEW LOW 30 MIN: CPT | Mod: S$GLB,,, | Performed by: PHYSICIAN ASSISTANT

## 2018-02-26 PROCEDURE — 3008F BODY MASS INDEX DOCD: CPT | Mod: S$GLB,,, | Performed by: PHYSICIAN ASSISTANT

## 2018-02-26 PROCEDURE — 99999 PR PBB SHADOW E&M-EST. PATIENT-LVL IV: CPT | Mod: PBBFAC,,, | Performed by: PHYSICIAN ASSISTANT

## 2018-02-26 NOTE — LETTER
February 27, 2018      Renate De La O DO  [address] [address]  [address]  [facility] [address]           [facility]  [address] [facility] [address]  [facility] [address]  Phone: [phone]  Fax: [phone]          Patient: Alley Chamorro   MR Number: [id number]   YOB: 1975   Date of Visit: 2/26/2018       Dear Dr. Renate De La O:    Thank you for referring Alley Chamorro to me for evaluation. Attached you will find relevant portions of my assessment and plan of care.    If you have questions, please do not hesitate to call me. I look forward to following Alley Chamorro along with you.    Sincerely,    Shaina Contreras PA-C    Enclosure  CC:  No Recipients    If you would like to receive this communication electronically, please contact [email or web address] or [phone] to request more information on [employer] Link access.    For providers and/or their staff who would like to refer a patient to [facility], please contact us through our one-stop-shop provider referral line, [facility] , at [phone].    If you feel you have received this communication in error or would no longer like to receive these types of communications, please e-mail [email or web address]

## 2018-02-26 NOTE — TELEPHONE ENCOUNTER
----- Message from Kate Hayes sent at 2/26/2018  7:23 AM CST -----  Contact: self  Patient [phone] is saying that she spoke with a nurse at Dr De La O office and was advised that she could come by the office to  some medical records to take to [facility]/I advised her she needed to go thru Medical Records and she states that is not what she was told/please advise

## 2018-02-26 NOTE — TELEPHONE ENCOUNTER
Spoke with pt,   Verified the medical records requested are in front  area for pickup.  Pt was on her way to pickup.

## 2018-02-27 NOTE — PROGRESS NOTES
Neurosurgery History & Physical    Patient ID: Alley Chamorro is a 43 y.o. female.    Chief Complaint   Patient presents with    Low-back Pain     Pain radiates up mid back. Has had pain for many years. Nothing makes pain better. Movement makes pain worse.    Leg Pain     Pain radiates down both legs       Review of Systems   Constitutional: Negative for activity change, appetite change, chills, fever and unexpected weight change.   HENT: Negative for tinnitus, trouble swallowing and voice change.    Respiratory: Negative for apnea, cough, chest tightness and shortness of breath.    Cardiovascular: Negative for chest pain and palpitations.   Gastrointestinal: Negative for constipation, diarrhea, nausea and vomiting.   Genitourinary: Negative for difficulty urinating, dysuria, frequency and urgency.   Musculoskeletal: Positive for arthralgias, back pain, myalgias and neck pain. Negative for gait problem and neck stiffness.   Skin: Negative for wound.   Neurological: Positive for numbness. Negative for dizziness, tremors, seizures, facial asymmetry, speech difficulty, weakness, light-headedness and headaches.   Psychiatric/Behavioral: Negative for confusion and decreased concentration.       Past Medical History:   Diagnosis Date    Anxiety     Chronic back pain     Depression     Hyperlipidemia     Rheumatoid arthritis     Scoliosis      Social History     Social History    Marital status: Legally      Spouse name: N/A    Number of children: N/A    Years of education: N/A     Occupational History    disability due to low back      Social History Main Topics    Smoking status: Current Every Day Smoker     Packs/day: 1.00     Years: 30.00     Types: Cigarettes    Smokeless tobacco: Never Used    Alcohol use 1.2 oz/week     2 Shots of liquor per week    Drug use: Yes     Types: Marijuana      Comment: for pain    Sexual activity: Yes     Partners: Male     Other Topics Concern    Not on  "file     Social History Narrative    No narrative on file     Family History   Problem Relation Age of Onset    COPD Father     Cancer Father     Diabetes Paternal Aunt     Diabetes Maternal Grandmother      Review of patient's allergies indicates:   Allergen Reactions    Aleve [naproxen sodium] Anaphylaxis and Itching       Current Outpatient Prescriptions:     baclofen (LIORESAL) 20 MG tablet, Take 0.5 tablets (10 mg total) by mouth 3 (three) times daily. (Patient taking differently: Take 20 mg by mouth 3 (three) times daily. ), Disp: 90 tablet, Rfl: 3    DULoxetine (CYMBALTA) 30 MG capsule, Take one tablet daily x 1 week then take 1 tablet bid x 1 week then take 2 tablets qam and 1 tablet qpm x 1 week then take 1 tablet bid, Disp: 70 capsule, Rfl: 0    gabapentin (NEURONTIN) 300 MG capsule, Take 1 capsule (300 mg total) by mouth 3 (three) times daily., Disp: 90 capsule, Rfl: 3    meloxicam (MOBIC) 15 MG tablet, Take 1 tablet (15 mg total) by mouth once daily., Disp: 30 tablet, Rfl: 6    OLANZapine (ZYPREXA) 5 MG tablet, Take 1 tablet (5 mg total) by mouth every evening., Disp: 30 tablet, Rfl: 3    rosuvastatin (CRESTOR) 40 MG Tab, Take 1 tablet (40 mg total) by mouth every evening., Disp: 90 tablet, Rfl: 3    DULoxetine (CYMBALTA) 60 MG capsule, Take 1 capsule (60 mg total) by mouth 2 (two) times daily., Disp: 60 capsule, Rfl: 3    Vitals:    02/26/18 1006   BP: (!) 112/58   BP Location: Left arm   Patient Position: Sitting   BP Method: Medium (Manual)   Pulse: 80   Weight: 60.3 kg (132 lb 15 oz)   Height: 5' 5" (1.651 m)       Physical Exam   Constitutional: She is oriented to person, place, and time. She appears well-developed and well-nourished.   HENT:   Head: Normocephalic and atraumatic.   Eyes: Pupils are equal, round, and reactive to light.   Neck: Normal range of motion. Neck supple.   Cardiovascular: Normal rate.    Pulmonary/Chest: Effort normal.   Musculoskeletal: Normal range of motion. " She exhibits no edema.   Neurological: She is alert and oriented to person, place, and time. She has a normal Finger-Nose-Finger Test, a normal Heel to Shin Test, a normal Romberg Test and a normal Tandem Gait Test. Gait normal.   Reflex Scores:       Tricep reflexes are 2+ on the right side and 2+ on the left side.       Bicep reflexes are 2+ on the right side and 2+ on the left side.       Brachioradialis reflexes are 2+ on the right side and 2+ on the left side.       Patellar reflexes are 2+ on the right side and 2+ on the left side.       Achilles reflexes are 2+ on the right side and 2+ on the left side.  Skin: Skin is warm, dry and intact.   Psychiatric: She has a normal mood and affect. Her speech is normal and behavior is normal. Judgment and thought content normal.   Nursing note and vitals reviewed.      Neurologic Exam     Mental Status   Oriented to person, place, and time.   Oriented to person.   Oriented to place.   Oriented to time.   Follows 3 step commands.   Attention: normal. Concentration: normal.   Speech: speech is normal   Level of consciousness: alert  Knowledge: consistent with education.   Able to name object. Able to read. Able to repeat. Able to write. Normal comprehension.     Cranial Nerves     CN II   Visual acuity: normal  Right visual field deficit: none  Left visual field deficit: none     CN III, IV, VI   Pupils are equal, round, and reactive to light.  Right pupil: Size: 3 mm. Shape: regular. Reactivity: brisk. Consensual response: intact.   Left pupil: Size: 3 mm. Shape: regular. Reactivity: brisk. Consensual response: intact.   CN III: no CN III palsy  CN VI: no CN VI palsy  Nystagmus: none   Diplopia: none  Ophthalmoparesis: none  Conjugate gaze: present    CN V   Right facial sensation deficit: none  Left facial sensation deficit: none    CN VII   Right facial weakness: none  Left facial weakness: none    CN VIII   Hearing: intact    CN IX, X   CN IX normal.   CN X normal.      CN XI   Right sternocleidomastoid strength: normal  Left sternocleidomastoid strength: normal  Right trapezius strength: normal  Left trapezius strength: normal    CN XII   Fasciculations: absent  Tongue deviation: none    Motor Exam   Muscle bulk: normal  Overall muscle tone: normal  Right arm pronator drift: absent  Left arm pronator drift: absent    Strength   Right neck flexion: 5/5  Left neck flexion: 5/5  Right neck extension: 5/5  Left neck extension: 5/5  Right deltoid: 5/5  Left deltoid: 5/5  Right biceps: 5/5  Left biceps: 5/5  Right triceps: 5/5  Left triceps: 5/5  Right wrist flexion: 5/5  Left wrist flexion: 5/5  Right wrist extension: 5/5  Left wrist extension: 5/5  Right interossei: 5/5  Left interossei: 5/5  Right abdominals: 5/5  Left abdominals: 5/5  Right iliopsoas: 5/5  Left iliopsoas: 5/5  Right quadriceps: 5/5  Left quadriceps: 5/5  Right hamstrin/5  Left hamstrin/5  Right glutei: 5/5  Left glutei: 5/5  Right anterior tibial: 5/5  Left anterior tibial: 5/5  Right posterior tibial: 5/5  Left posterior tibial: 5/5  Right peroneal: 5/5  Left peroneal: 5/5  Right gastroc: 5/5  Left gastroc: 5/5    Sensory Exam   Right arm light touch: normal  Left arm light touch: normal  Right leg light touch: normal  Left leg light touch: normal  Right arm vibration: normal  Left arm vibration: normal  Right arm pinprick: normal  Left arm pinprick: normal    Gait, Coordination, and Reflexes     Gait  Gait: normal    Coordination   Romberg: negative  Finger to nose coordination: normal  Heel to shin coordination: normal  Tandem walking coordination: normal    Tremor   Resting tremor: absent  Intention tremor: absent  Action tremor: absent    Reflexes   Right brachioradialis: 2+  Left brachioradialis: 2+  Right biceps: 2+  Left biceps: 2+  Right triceps: 2+  Left triceps: 2+  Right patellar: 2+  Left patellar: 2+  Right achilles: 2+  Left achilles: 2+  Right Montoya: absent  Left Montoya: absent  Right  ankle clonus: absent  Left ankle clonus: absent      Provider dictation:  43 year old female is referred by Renate De La O for evaluation of back pain.  She has has a history of chronic neck and back pain and depression.  She also reports a history of scoliosis, but there is no record of scoliosis in her chart.  She describes pain across the lower back with radiation superiorly up the spine to the neck and inferiorly to the bilateral legs in a generalized distribution.  It is associated with numbness and tingling.  She is taking gabapentin and meloxicam.  She recalls PT and ILA in the past.   Oswestry score: 46%.  PHQ:  8.    She is neurologically intact on exam without any focal deficits noted.    She presents with reports only:  MRI brain 7/24/15:  Normal  EMG/ NCV upper extremities 7-16-15:  Left carpal tunnel  MRI c-spine 7/24/15:  C5/6 cervical spondylosis with moderate foraminal narrowing  CT myelogram C-spine 7/22/15:  No significant abnormality or foraminal narrowing.  MRI lumbar spine 4-20-16:  L4/5 spondylosis with bilateral foraminal narrowing.  MRI lumbar spine 4-29-16:  No significant abnormality.    Ms. Chamorro has chronic lower back and generalized bilateral leg pain/ numbness in a non-focal dermatomal distribution.  Her prior MRI reports of the lumbar spine - one describes L4/5 spondylosis with bilateral foraminal narrowing and the other taken 9 days later describes a normal study- are in conclusive for the cause of her pain.  With such generalized pain, pain may be related and complicated by depression.  Nevertheless, to give her a current opinon as pain as persisted we will obtain xrays and an MRI lumbar spine.  We will also have her undergo an EMG/ NCV of the bilateral lower extremities as her pattern of pain does not have a focal distribution to it.  Follow up in clinic after imaging is complete.    Visit Diagnosis:  Chronic bilateral low back pain with bilateral sciatica  -     X-Ray Lumbar Complete  With Flex And Ext; Future; Expected date: 02/26/2018  -     X-Ray Scoliosis Complete; Future; Expected date: 02/26/2018  -     MRI Lumbar Spine Without Contrast; Future; Expected date: 02/26/2018  -     EMG W/ ULTRASOUND AND NERVE CONDUCTION TEST 2 Extremities; Future    Lumbar spondylosis  -     X-Ray Lumbar Complete With Flex And Ext; Future; Expected date: 02/26/2018  -     X-Ray Scoliosis Complete; Future; Expected date: 02/26/2018  -     MRI Lumbar Spine Without Contrast; Future; Expected date: 02/26/2018  -     EMG W/ ULTRASOUND AND NERVE CONDUCTION TEST 2 Extremities; Future    Scoliosis, unspecified scoliosis type, unspecified spinal region  -     X-Ray Lumbar Complete With Flex And Ext; Future; Expected date: 02/26/2018  -     X-Ray Scoliosis Complete; Future; Expected date: 02/26/2018  -     MRI Lumbar Spine Without Contrast; Future; Expected date: 02/26/2018  -     EMG W/ ULTRASOUND AND NERVE CONDUCTION TEST 2 Extremities; Future    Moderate episode of recurrent major depressive disorder  -     X-Ray Lumbar Complete With Flex And Ext; Future; Expected date: 02/26/2018  -     X-Ray Scoliosis Complete; Future; Expected date: 02/26/2018  -     MRI Lumbar Spine Without Contrast; Future; Expected date: 02/26/2018  -     EMG W/ ULTRASOUND AND NERVE CONDUCTION TEST 2 Extremities; Future        Total time spent counseling greater than fifty percent of total visit time.  Counseling included discussion regarding imaging findings, diagnosis possibilities, treatment options, risks and benefits.   The patient had many questions regarding the options and long-term effects.

## 2018-03-01 ENCOUNTER — OFFICE VISIT (OUTPATIENT)
Dept: ORTHOPEDICS | Facility: CLINIC | Age: 43
End: 2018-03-01
Payer: MEDICARE

## 2018-03-01 VITALS — HEIGHT: 65 IN | BODY MASS INDEX: 21.99 KG/M2 | WEIGHT: 132 LBS

## 2018-03-01 DIAGNOSIS — S50.01XD CONTUSION OF RIGHT ELBOW, SUBSEQUENT ENCOUNTER: ICD-10-CM

## 2018-03-01 DIAGNOSIS — M25.521 RIGHT ELBOW PAIN: Primary | ICD-10-CM

## 2018-03-01 PROCEDURE — 99999 PR PBB SHADOW E&M-EST. PATIENT-LVL III: CPT | Mod: PBBFAC,,, | Performed by: ORTHOPAEDIC SURGERY

## 2018-03-01 PROCEDURE — 99213 OFFICE O/P EST LOW 20 MIN: CPT | Mod: S$GLB,,, | Performed by: ORTHOPAEDIC SURGERY

## 2018-03-02 NOTE — PROGRESS NOTES
"DATE: 3/1/2018  PATIENT: Alley Chamorro    Attending Physician: Ruel López M.D.    HISTORY:  Alley Chamorro is a 43 y.o. female who returns for follow up evaluation of  her right elbow.  She is diagnosed with a contusion, date of injury was 1/3/18.  She still reports significant discomfort and statesimprovement.  Pain is reported at 7/10 today.    PMH/PSH/FamHx/SocHx:  Reviewed and unchanged from prior visit    ROS:  Constitution: Negative for chills, fever, and sweats. Negative for unexplained weight loss.  HENT: Negative for headaches and blurry vision.   Cardiovascular: Negative for chest pain, irregular heartbeat, leg swelling and palpitations.   Respiratory: Negative for cough and shortness of breath.   Gastrointestinal: Negative for abdominal pain, heartburn, nausea and vomiting.   Genitourinary: Negative for bladder incontinence and dysuria.   Musculoskeletal: Negative for systemic arthritis, joint swelling, muscle weakness and myalgias.   Neurological: Negative for numbness.   Psychiatric/Behavioral: Negative for depression.   Endocrine: Negative for polyuria.   Hematologic/Lymphatic: Negative for bleeding disorders.  Skin: Negative for poor wound healing.       EXAM:  Ht 5' 5" (1.651 m)   Wt 59.9 kg (132 lb)   BMI 21.97 kg/m²   Middle-age female appearing slightly older than her stated age in no acute distress.  Examination right elbow reveals no ecchymosis, swelling or effusion.   there is still tenderness over the radial head.  Range of motion although comfortable is full to flexion and extension.  Supination to 80°, pronation 60°.  Sensation intact to the right upper extremity.  No crepitus or click noted with range of motion.       IMAGING:   No x-rays are performed today.    ASSESSMENT:  Contusion right elbow, DOI 1/3/18.    PLAN:  The implications of the patient's evolution of symptoms and findings were explained to the patient in detail.  As she still remain significant " symptomatic and does have tenderness over the radial head/neck, I have recommended an MRI to rule out an occult fracture.  Follow-up after MRI has been performed to discuss the results and further treatment recommendations        This note was dictated using voice recognition software. Please excuse any grammatical or typographical errors.

## 2018-03-07 ENCOUNTER — HOSPITAL ENCOUNTER (OUTPATIENT)
Dept: RADIOLOGY | Facility: HOSPITAL | Age: 43
Discharge: HOME | End: 2018-03-07
Attending: PHYSICIAN ASSISTANT
Payer: MEDICARE

## 2018-03-07 DIAGNOSIS — M54.41 CHRONIC BILATERAL LOW BACK PAIN WITH BILATERAL SCIATICA: ICD-10-CM

## 2018-03-07 DIAGNOSIS — M41.9 SCOLIOSIS, UNSPECIFIED SCOLIOSIS TYPE, UNSPECIFIED SPINAL REGION: ICD-10-CM

## 2018-03-07 DIAGNOSIS — F33.1 MODERATE EPISODE OF RECURRENT MAJOR DEPRESSIVE DISORDER: ICD-10-CM

## 2018-03-07 DIAGNOSIS — G89.29 CHRONIC BILATERAL LOW BACK PAIN WITH BILATERAL SCIATICA: ICD-10-CM

## 2018-03-07 DIAGNOSIS — M47.816 LUMBAR SPONDYLOSIS: ICD-10-CM

## 2018-03-07 DIAGNOSIS — M54.42 CHRONIC BILATERAL LOW BACK PAIN WITH BILATERAL SCIATICA: ICD-10-CM

## 2018-03-07 PROCEDURE — 72148 MRI LUMBAR SPINE W/O DYE: CPT | Mod: 26,,, | Performed by: RADIOLOGY

## 2018-03-07 PROCEDURE — 72148 MRI LUMBAR SPINE W/O DYE: CPT | Mod: TC,PO

## 2018-03-07 PROCEDURE — 72114 X-RAY EXAM L-S SPINE BENDING: CPT | Mod: TC,FY,PO,59

## 2018-03-07 PROCEDURE — 72082 X-RAY EXAM ENTIRE SPI 2/3 VW: CPT | Mod: TC,FY,PO

## 2018-03-07 PROCEDURE — 72082 X-RAY EXAM ENTIRE SPI 2/3 VW: CPT | Mod: 26,,, | Performed by: RADIOLOGY

## 2018-03-07 PROCEDURE — 72114 X-RAY EXAM L-S SPINE BENDING: CPT | Mod: 26,59,, | Performed by: RADIOLOGY

## 2018-03-08 ENCOUNTER — OFFICE VISIT (OUTPATIENT)
Dept: FAMILY MEDICINE | Facility: CLINIC | Age: 43
End: 2018-03-08
Payer: MEDICARE

## 2018-03-08 ENCOUNTER — TELEPHONE (OUTPATIENT)
Dept: FAMILY MEDICINE | Facility: CLINIC | Age: 43
End: 2018-03-08

## 2018-03-08 VITALS
TEMPERATURE: 98 F | DIASTOLIC BLOOD PRESSURE: 68 MMHG | SYSTOLIC BLOOD PRESSURE: 112 MMHG | BODY MASS INDEX: 23.95 KG/M2 | HEART RATE: 76 BPM | WEIGHT: 143.75 LBS | RESPIRATION RATE: 16 BRPM | HEIGHT: 65 IN | OXYGEN SATURATION: 98 %

## 2018-03-08 DIAGNOSIS — M54.42 CHRONIC BILATERAL LOW BACK PAIN WITH BILATERAL SCIATICA: ICD-10-CM

## 2018-03-08 DIAGNOSIS — F33.1 MODERATE EPISODE OF RECURRENT MAJOR DEPRESSIVE DISORDER: Primary | ICD-10-CM

## 2018-03-08 DIAGNOSIS — M25.521 RIGHT ELBOW PAIN: ICD-10-CM

## 2018-03-08 DIAGNOSIS — G89.29 CHRONIC BILATERAL LOW BACK PAIN WITH BILATERAL SCIATICA: ICD-10-CM

## 2018-03-08 DIAGNOSIS — J20.9 ACUTE BRONCHITIS, UNSPECIFIED ORGANISM: ICD-10-CM

## 2018-03-08 DIAGNOSIS — M54.41 CHRONIC BILATERAL LOW BACK PAIN WITH BILATERAL SCIATICA: ICD-10-CM

## 2018-03-08 DIAGNOSIS — F41.1 GAD (GENERALIZED ANXIETY DISORDER): ICD-10-CM

## 2018-03-08 PROCEDURE — 99214 OFFICE O/P EST MOD 30 MIN: CPT | Mod: S$GLB,,, | Performed by: INTERNAL MEDICINE

## 2018-03-08 RX ORDER — OLANZAPINE 10 MG/1
10 TABLET ORAL NIGHTLY
Qty: 30 TABLET | Refills: 11 | Status: SHIPPED | OUTPATIENT
Start: 2018-03-08 | End: 2019-04-20 | Stop reason: SDUPTHER

## 2018-03-08 RX ORDER — BUSPIRONE HYDROCHLORIDE 5 MG/1
5 TABLET ORAL 2 TIMES DAILY PRN
Qty: 60 TABLET | Refills: 11 | Status: SHIPPED | OUTPATIENT
Start: 2018-03-08 | End: 2019-03-08

## 2018-03-08 RX ORDER — BACLOFEN 20 MG/1
20 TABLET ORAL 3 TIMES DAILY
COMMUNITY
End: 2018-06-22

## 2018-03-08 NOTE — PROGRESS NOTES
Subjective:       Patient ID: Alley Chamorro is a 43 y.o. female.    Medication List with Changes/Refills   New Medications    BUSPIRONE (BUSPAR) 5 MG TAB    Take 1 tablet (5 mg total) by mouth 2 (two) times daily as needed.    OLANZAPINE (ZYPREXA) 10 MG TABLET    Take 1 tablet (10 mg total) by mouth every evening.   Current Medications    BACLOFEN (LIORESAL) 20 MG TABLET    Take 20 mg by mouth 3 (three) times daily.    DULOXETINE (CYMBALTA) 60 MG CAPSULE    Take 1 capsule (60 mg total) by mouth 2 (two) times daily.    GABAPENTIN (NEURONTIN) 300 MG CAPSULE    Take 1 capsule (300 mg total) by mouth 3 (three) times daily.    MELOXICAM (MOBIC) 15 MG TABLET    Take 1 tablet (15 mg total) by mouth once daily.    ROSUVASTATIN (CRESTOR) 40 MG TAB    Take 1 tablet (40 mg total) by mouth every evening.   Discontinued Medications    BACLOFEN (LIORESAL) 20 MG TABLET    Take 0.5 tablets (10 mg total) by mouth 3 (three) times daily.    DULOXETINE (CYMBALTA) 30 MG CAPSULE    Take one tablet daily x 1 week then take 1 tablet bid x 1 week then take 2 tablets qam and 1 tablet qpm x 1 week then take 1 tablet bid    OLANZAPINE (ZYPREXA) 5 MG TABLET    Take 1 tablet (5 mg total) by mouth every evening.       Chief Complaint: Follow-up  She is here today to f/u on multiple medical issues.     She has hyperlipidemia and lipids checked on 2/2018 were 246/136/52/166.  Her crestor was increased from 20 mg to 40 mg qday. She did make this change and is scheduled to recheck lipids in 3 months.     She has depression and anxiety is uncontrolled. At her last appt she was restarted on her cymbalta and increased over this month to 60 mg bid. She is doing better on this medication but still complains of depression. She was continued on zyprexa 5 mg qhs.  She says her anxiety is out of control.  She feels very stressed and overwhelmed.  She was advised to refer to psychiatry but was not able to get a reasonable appt.  She is off klonopin.       She has a contusion to her right elbow. She was seen by orthopedics who reviewed xrays and no evidence of fx. SHe continued to have pain in her elbow so is scheduled for MRI tomorrow and then f/u with orthopedics next week.     She was evaluated by neurosurgery for continued low back pain.  She was advised to continue gabapentin 300 mg tid, baclofen 10 mg tid and mobic 15 mg qday. She had an MRI done yesterday that showed:   There is mild degenerative change.  There is no fracture.  There is only trace retrolisthesis of L5 on S1.  There is some degree of disc bulge, disc protrusion and/or facet joint arthropathy at several levels.  The pertinent findings are summarized below.  2. There is no spinal canal or foraminal stenosis at the T11-12, T12-L1, L1-2, L2-3 or L3-4 levels.  3. At the L4-5 level, there is a disc bulge with superimposed broad shallow central and right paracentral disc protrusion with annular fissure in addition to mild facet joint arthropathy contributing to borderline spinal stenosis.  Also, there is crowding of the lateral recess bilaterally, right greater than left.  This could affect the L5 nerve roots.  There is only mild foraminal stenosis without suspected nerve root compression.  4. At the L5-S1 level, there is a shallow central disc protrusion with subtle annular fissure which does not obviously contact or compress the S1 roots.  There is mild facet joint arthropathy.  There is no spinal stenosis.  She is scheduled to have EMG/NCS in mid April and then f/u with NS.  She continues to complain of severe pain with stiffness. She complains of pain down both her legs.  She continues to use marijuana to help control pain.     She complains of one week of increasing cough with sputum that is yellow to white. No blood in sputum. No wheezing. No congestion or sinus pressure. No shortness of breath. No fevers.  No ear pain or sore throat.      Review of Systems   Constitutional: Negative for  "appetite change, fatigue, fever and unexpected weight change.   HENT: Negative for congestion, ear pain, hearing loss, sore throat and trouble swallowing.    Eyes: Negative for pain and visual disturbance.   Respiratory: Negative for cough, chest tightness, shortness of breath and wheezing.    Cardiovascular: Negative for chest pain, palpitations and leg swelling.   Gastrointestinal: Negative for abdominal pain, blood in stool, constipation, diarrhea, nausea and vomiting.   Endocrine: Negative for polyuria.   Genitourinary: Negative for dysuria and hematuria.   Musculoskeletal: Positive for arthralgias, back pain, myalgias and neck stiffness.   Skin: Negative for rash.   Neurological: Negative for dizziness, weakness, numbness and headaches.   Hematological: Does not bruise/bleed easily.   Psychiatric/Behavioral: Positive for dysphoric mood. Negative for sleep disturbance and suicidal ideas. The patient is nervous/anxious.        Objective:      Vitals:    03/08/18 0901   BP: 112/68   Pulse: 76   Resp: 16   Temp: 97.9 °F (36.6 °C)   TempSrc: Oral   SpO2: 98%   Weight: 65.2 kg (143 lb 11.8 oz)   Height: 5' 5" (1.651 m)     Body mass index is 23.92 kg/m².  Physical Exam    General appearance: alert, no acute distress  Head: atraumatic  Eyes: PERRL, EMOI, normal conjunctiva, no drainage  Ears: tm normal with good visualization of landmarks, no erythema or pus, canals normal, external ear normal  Nose: normal mucosa, no polyps or sores, no rhinorrhea  Throat: no erythema, no exudates, tonsils appear normal  Mouth: no sores or lesion, moist mucous membranes  Neck: supple, FROM, no masses, no tenderness  Lymph: no posterior or cervical adenopathy  Lungs: no distress, no retractions, clear to ascultation bilaterally, no wheezing, no rales, no rhonchi  Heart:: Regular rate and rhythm, no murmur  Abdomen: soft, non-tender, no guarding, no rebound, no peritoneal signs, bowel sounds normal, no hepatosplenomegaly, no " masses  Skin: no rashes or lesion  Perfusion: good capillary refill, normal pulses      Assessment:       1. Moderate episode of recurrent major depressive disorder    2. REMINGTON (generalized anxiety disorder)    3. Acute bronchitis, unspecified organism    4. Chronic bilateral low back pain with bilateral sciatica    5. Right elbow pain        Plan:       Moderate episode of recurrent major depressive disorder  Uncontrolled and will continue cymbalta at 60 mg bid and increase her zyprexa to 10 mg qhs.    -     OLANZapine (ZYPREXA) 10 MG tablet; Take 1 tablet (10 mg total) by mouth every evening.  Dispense: 30 tablet; Refill: 11    REMINGTON (generalized anxiety disorder)  Will add buspar bid to her regimen to help with situational anxiety. I encouraged her to establish with psychiatry.   -     busPIRone (BUSPAR) 5 MG Tab; Take 1 tablet (5 mg total) by mouth 2 (two) times daily as needed.  Dispense: 60 tablet; Refill: 11    Acute bronchitis, unspecified organism  Reassurance and supportive care. No need for antibiotics at this point. Advised of the signs of worsening to return to clinic.      Chronic bilateral low back pain with bilateral sciatica  Uncontrolled.  She was seen by NS and is in the middle of her workup.  She is to get EMG/NCS and f/u in one month. I have made a referral to pain management (after her NS appt) to see if a therapeutic procedure could be done at her L4-L5 spinal level to help alleviate pain.  She continues to use gabapentin, baclofen and mobic. She continues to use marijuana for pain.   -     Ambulatory referral to Pain Clinic    Right elbow pain  She continues to follow with orthopedics and is getting an MRI tomorrow.     Follow-up in about 3 months (around 6/8/2018) for chronic medical issues.

## 2018-03-08 NOTE — TELEPHONE ENCOUNTER
----- Message from Dorina Ruiz sent at 3/8/2018 10:34 AM CST -----  Contact: Carina Fox  called regarding the patient' release back to work from shingles. Wanted to verify if she is not contagious. Left a message previously today. Please contact [phone]

## 2018-03-09 ENCOUNTER — HOSPITAL ENCOUNTER (OUTPATIENT)
Dept: RADIOLOGY | Facility: HOSPITAL | Age: 43
Discharge: HOME | End: 2018-03-09
Attending: ORTHOPAEDIC SURGERY
Payer: MEDICARE

## 2018-03-09 DIAGNOSIS — M25.521 RIGHT ELBOW PAIN: ICD-10-CM

## 2018-03-09 PROCEDURE — 73221 MRI JOINT UPR EXTREM W/O DYE: CPT | Mod: TC,PO,RT

## 2018-03-09 PROCEDURE — 73221 MRI JOINT UPR EXTREM W/O DYE: CPT | Mod: 26,RT,, | Performed by: RADIOLOGY

## 2018-03-12 ENCOUNTER — OFFICE VISIT (OUTPATIENT)
Dept: ORTHOPEDICS | Facility: CLINIC | Age: 43
End: 2018-03-12
Payer: MEDICARE

## 2018-03-12 VITALS — BODY MASS INDEX: 23.82 KG/M2 | HEIGHT: 65 IN | WEIGHT: 143 LBS

## 2018-03-12 DIAGNOSIS — M77.11 LATERAL EPICONDYLITIS, RIGHT ELBOW: Primary | ICD-10-CM

## 2018-03-12 PROCEDURE — 76942 ECHO GUIDE FOR BIOPSY: CPT | Mod: 26,S$GLB,, | Performed by: ORTHOPAEDIC SURGERY

## 2018-03-12 PROCEDURE — 99214 OFFICE O/P EST MOD 30 MIN: CPT | Mod: 25,S$GLB,, | Performed by: ORTHOPAEDIC SURGERY

## 2018-03-12 PROCEDURE — 99999 PR PBB SHADOW E&M-EST. PATIENT-LVL II: CPT | Mod: PBBFAC,,, | Performed by: ORTHOPAEDIC SURGERY

## 2018-03-12 PROCEDURE — 20551 NJX 1 TENDON ORIGIN/INSJ: CPT | Mod: RT,S$GLB,, | Performed by: ORTHOPAEDIC SURGERY

## 2018-03-12 RX ORDER — TRIAMCINOLONE ACETONIDE 40 MG/ML
40 INJECTION, SUSPENSION INTRA-ARTICULAR; INTRAMUSCULAR
Status: DISCONTINUED | OUTPATIENT
Start: 2018-03-12 | End: 2018-03-13 | Stop reason: HOSPADM

## 2018-03-12 RX ADMIN — TRIAMCINOLONE ACETONIDE 40 MG: 40 INJECTION, SUSPENSION INTRA-ARTICULAR; INTRAMUSCULAR at 09:03

## 2018-03-13 NOTE — PROCEDURES
Tendon Origin  Date/Time: 3/12/2018 9:30 AM  Performed by: DANIELA LEMUS  Authorized by: DANIELA LEMUS     Consent Done?:  Yes (Verbal)  Timeout: prior to procedure the correct patient, procedure, and site was verified    Indications:  Pain  Site marked: the procedure site was marked    Timeout: prior to procedure the correct patient, procedure, and site was verified    Location:  Elbow  Site:  R elbow (Right common extensor origin at the lateral epicondyle)  Prep: patient was prepped and draped in usual sterile fashion    Ultrasonic Guidance for Needle Placement?: Yes    Needle size:  25 G  Approach:  Anterolateral  Medications:  40 mg triamcinolone acetonide 40 mg/mL  Patient tolerance:  Patient tolerated the procedure well with no immediate complications

## 2018-03-13 NOTE — PROGRESS NOTES
"DATE: 3/12/2018  PATIENT: Alley Chamorro    Attending Physician: Ruel López M.D.    HISTORY:  Alley Chamorro is a 43 y.o. female who returns for follow up evaluation of  her right elbow.  She did undergo an MRI which was negative for fracture.  She did have radiographic findings consistent with lateral epicondylitis.  She states her pain is 8/10.  She has been using the wrist brace.  She returns for further treatment recommendations    PMH/PSH/FamHx/SocHx:  Reviewed and unchanged from prior visit    ROS:  Constitution: Negative for chills, fever, and sweats. Negative for unexplained weight loss.  HENT: Negative for headaches and blurry vision.   Cardiovascular: Negative for chest pain, irregular heartbeat, leg swelling and palpitations.   Respiratory: Negative for cough and shortness of breath.   Gastrointestinal: Negative for abdominal pain, heartburn, nausea and vomiting.   Genitourinary: Negative for bladder incontinence and dysuria.   Musculoskeletal: Negative for systemic arthritis, joint swelling, muscle weakness and myalgias.   Neurological: Negative for numbness.   Psychiatric/Behavioral: Negative for depression.   Endocrine: Negative for polyuria.   Hematologic/Lymphatic: Negative for bleeding disorders.  Skin: Negative for poor wound healing.       EXAM:  Ht 5' 5" (1.651 m)   Wt 64.9 kg (143 lb)   BMI 23.80 kg/m²   Middle-age female appearing slightly older than her stated age in no acute distress.  Examination right elbow reveals no ecchymosis, swelling or effusion.   there is still tenderness over the radial head.  Range of motion although comfortable is full to flexion and extension.  Supination to 80°, pronation 60°.  Sensation intact to the right upper extremity.  No crepitus or click noted with range of motion.    IMAGING:   MRI is personally reviewed and consistent with the radiologist's report.  No acute fractures or bony changes seen.  No ligamentous injuries noted.  " Findings consistent with lateral epicondylitis noted.    ASSESSMENT:  Epicondylitis right elbow    PLAN:  The implications of the patient's evolution of symptoms and findings were explained to the patient in detail.Treatment option discussed included continued observation and splinting, therapy, ultrasound guided cortisone injection, referral to Dr. Barclay for Tenex Procedure. All questions answered and the patient wishes to proceed with injection (performed today).  She'll monitor her symptoms over the next few weeks.  I have encouraged her to quit smoking which would be helpful in her recovery.  Follow-up if not improving or worse.          This note was dictated using voice recognition software. Please excuse any grammatical or typographical errors.

## 2018-04-11 ENCOUNTER — TELEPHONE (OUTPATIENT)
Dept: FAMILY MEDICINE | Facility: CLINIC | Age: 43
End: 2018-04-11

## 2018-04-11 NOTE — TELEPHONE ENCOUNTER
----- Message from Dorina Nieves sent at 4/11/2018  1:46 PM CDT -----  Please call patient in regards to her cough that hasnt gotten any better, please call [phone] (home)

## 2018-04-11 NOTE — TELEPHONE ENCOUNTER
" Spoke with pt. Stated that she mentioned this to you during her appointment in early March.  No fever, her sinuses are draining after she coughs, it chokes her. Mucous is yellow. Her biggest concern is that it makes her "tinkle" when she coughs.    "

## 2018-04-17 ENCOUNTER — OFFICE VISIT (OUTPATIENT)
Dept: PHYSICAL MEDICINE AND REHAB | Facility: CLINIC | Age: 43
End: 2018-04-17
Payer: MEDICARE

## 2018-04-17 DIAGNOSIS — G89.29 CHRONIC BILATERAL LOW BACK PAIN WITH BILATERAL SCIATICA: ICD-10-CM

## 2018-04-17 DIAGNOSIS — M41.9 SCOLIOSIS, UNSPECIFIED SCOLIOSIS TYPE, UNSPECIFIED SPINAL REGION: ICD-10-CM

## 2018-04-17 DIAGNOSIS — M54.41 CHRONIC BILATERAL LOW BACK PAIN WITH BILATERAL SCIATICA: ICD-10-CM

## 2018-04-17 DIAGNOSIS — M47.816 LUMBAR SPONDYLOSIS: ICD-10-CM

## 2018-04-17 DIAGNOSIS — M54.42 CHRONIC BILATERAL LOW BACK PAIN WITH BILATERAL SCIATICA: ICD-10-CM

## 2018-04-17 DIAGNOSIS — F33.1 MODERATE EPISODE OF RECURRENT MAJOR DEPRESSIVE DISORDER: ICD-10-CM

## 2018-04-17 PROCEDURE — 95909 NRV CNDJ TST 5-6 STUDIES: CPT | Mod: 26,,, | Performed by: PHYSICAL MEDICINE & REHABILITATION

## 2018-04-17 PROCEDURE — 95886 MUSC TEST DONE W/N TEST COMP: CPT | Mod: 26,,, | Performed by: PHYSICAL MEDICINE & REHABILITATION

## 2018-04-17 PROCEDURE — 99499 UNLISTED E&M SERVICE: CPT | Mod: S$GLB,,, | Performed by: PHYSICAL MEDICINE & REHABILITATION

## 2018-04-17 NOTE — LETTER
April 17, 2018      Shaina Contreras PA-C  [address] [facility] [address]  [address]  [facility] [address]           [facility]  [address] [facility] [address] [place] [address]  Phone: [phone]  Fax: [phone]          Patient: Alley Chamorro   MR Number: [id number]   YOB: 1975   Date of Visit: 4/17/2018       Dear Shaina Contreras:    Thank you for referring Alley Chamorro to me for evaluation. Attached you will find relevant portions of my assessment and plan of care.    If you have questions, please do not hesitate to call me. I look forward to following Alley Chamorro along with you.    Sincerely,    Jonathan Barclay MD    Enclosure  CC:  No Recipients    If you would like to receive this communication electronically, please contact [email or web address] or [phone] to request more information on [facility] Link access.    For providers and/or their staff who would like to refer a patient to [facility], please contact us through our one-stop-shop provider referral line, [facility], at [phone].    If you feel you have received this communication in error or would no longer like to receive these types of communications, please e-mail [email or web address]

## 2018-04-17 NOTE — PROGRESS NOTES
[facility]  [address] [facility]  [place] [address]             Full Name: CARMEN HILLIARD Gender: Female  Patient ID: [id number] YOB: 1975  History: BLE pain for more than 10 years. Burning, stabbing pain. Right > Left. No hx of diabetes or EtOH abuse.      Visit Date: 4/17/2018 08:17  Age: 43 Years 3 Months Old  Examining Physician: HERMAN  Referring Physician: DIANELYS DECKER      Sensory NCS      Nerve / Sites Rec. Site Onset Lat Peak Lat NP Amp PP Amp Segments Distance Velocity     ms ms µV µV  cm m/s   R Sural - Ankle (Calf)      Calf Ankle 2.97 3.70 17.4 1.1 Calf - Ankle 14 47   L Sural - Ankle (Calf)      Calf Ankle 2.76 3.70 31.5 14.7 Calf - Ankle 14 51       Motor NCS      Nerve / Sites Muscle Latency Amplitude Amp % Duration Segments Distance Lat Diff Velocity     ms mV % ms  cm ms m/s   R Peroneal - EDB      Ankle EDB 3.70 5.7 100 6.20 Ankle - EDB 8        Fib head EDB 9.17 4.9 85.8 7.19 Fib head - Ankle 30 5.47 55   L Peroneal - EDB      Ankle EDB 4.53 8.2 100 8.13 Ankle - EDB 8        Fib head EDB 10.83 5.7 69.5 8.75 Fib head - Ankle 28 6.30 44   R Tibial - AH      Ankle AH 4.64 20.3 100 7.08 Ankle - AH 8        Pop fossa AH 11.41 12.6 62 8.70 Pop fossa - Ankle 37 6.77 55   L Tibial - AH      Ankle AH 4.43 21.0 100 8.28 Ankle - AH 8        Pop fossa AH 13.23 9.4 44.7 8.75 Pop fossa - Ankle 36 8.80 41       EMG         EMG Summary Table     Spontaneous MUAP Recruitment   Muscle IA Fib PSW Fasc H.F. Amp Dur. PPP Pattern   L. Vastus medialis N None None None None N N N N   L. Tibialis anterior N None None None None N N N N   L. Peroneus longus N None None None None N N N N   L. Gastrocnemius (Medial head) N None None None None N N N N   L. First dorsal interosseous N None None None None       R. Vastus medialis N None None None None N N N N   R. Tibialis anterior N None None None None N N N N   R. Peroneus longus N None None None None N N N N   R. Gastrocnemius (Medial head) N  None None None None N N N N   R. Gluteus medius N None None None None N N N N   R. Gluteus tino N None None None None       R. Lumbar paraspinals N None 1+ None None           Summary    The motor conduction test was normal in all 4 of the tested nerves: R Peroneal - EDB, L Peroneal - EDB, R Tibial - AH, L Tibial - AH.    The sensory conduction test was normal in all 2 of the tested nerves: R Sural - Ankle (Calf), L Sural - Ankle (Calf).    The needle EMG examination was performed in 12 muscles. It was normal in 11 muscle(s): L. Vastus medialis, L. Tibialis anterior, L. Peroneus longus, L. Gastrocnemius (Medial head), L. First dorsal interosseous, R. Vastus medialis, R. Tibialis anterior, R. Peroneus longus, R. Gastrocnemius (Medial head), R. Gluteus medius, R. Gluteus tino. The study was abnormal in 1 muscle(s), with the following distribution:   The R. Lumbar paraspinals had abnormal spontaneous activity.      Electrodiagnostic Impression:  1. Needle EMG findings are isolated to the right lumbar paraspinal musculature.  This by itself is insufficient for diagnosis of lumbosacral radiculopathy.  This may be indicative of lumbosacral radiculitis, or perhaps early radiculopathy.  Clinical correlation is required.  2. There is insufficient electrodiagnostic evidence for diagnoses of peripheral polyneuropathy, focal mononeuropathy, myopathy, or lumbosacral radiculopathy/plexopathy of the bilateral lower extremities.    Plan:  Todays test results will be sent to her referring neurosurgery team for further review and direction in her treatment.    Thank you very much for the referral. Please call if you have any questions regarding this study or the report.       -------------------------------  Jonathan Barclay M.D.

## 2018-04-19 ENCOUNTER — OFFICE VISIT (OUTPATIENT)
Dept: SPINE | Facility: CLINIC | Age: 43
End: 2018-04-19
Payer: MEDICARE

## 2018-04-19 ENCOUNTER — OFFICE VISIT (OUTPATIENT)
Dept: FAMILY MEDICINE | Facility: CLINIC | Age: 43
End: 2018-04-19
Payer: MEDICARE

## 2018-04-19 VITALS
RESPIRATION RATE: 18 BRPM | SYSTOLIC BLOOD PRESSURE: 102 MMHG | OXYGEN SATURATION: 97 % | BODY MASS INDEX: 23.54 KG/M2 | WEIGHT: 141.31 LBS | DIASTOLIC BLOOD PRESSURE: 62 MMHG | HEIGHT: 65 IN | TEMPERATURE: 98 F | HEART RATE: 83 BPM

## 2018-04-19 VITALS
SYSTOLIC BLOOD PRESSURE: 106 MMHG | DIASTOLIC BLOOD PRESSURE: 54 MMHG | HEART RATE: 80 BPM | WEIGHT: 132.94 LBS | HEIGHT: 65 IN | BODY MASS INDEX: 22.15 KG/M2

## 2018-04-19 DIAGNOSIS — G89.29 CHRONIC BILATERAL LOW BACK PAIN WITH BILATERAL SCIATICA: Primary | ICD-10-CM

## 2018-04-19 DIAGNOSIS — M47.816 LUMBAR SPONDYLOSIS: ICD-10-CM

## 2018-04-19 DIAGNOSIS — J44.1 COPD EXACERBATION: Primary | ICD-10-CM

## 2018-04-19 DIAGNOSIS — J20.9 ACUTE BRONCHITIS WITH CHRONIC OBSTRUCTIVE PULMONARY DISEASE (COPD): ICD-10-CM

## 2018-04-19 DIAGNOSIS — M54.42 CHRONIC BILATERAL LOW BACK PAIN WITH BILATERAL SCIATICA: Primary | ICD-10-CM

## 2018-04-19 DIAGNOSIS — J44.0 ACUTE BRONCHITIS WITH CHRONIC OBSTRUCTIVE PULMONARY DISEASE (COPD): ICD-10-CM

## 2018-04-19 DIAGNOSIS — M41.9 SCOLIOSIS, UNSPECIFIED SCOLIOSIS TYPE, UNSPECIFIED SPINAL REGION: ICD-10-CM

## 2018-04-19 DIAGNOSIS — M54.41 CHRONIC BILATERAL LOW BACK PAIN WITH BILATERAL SCIATICA: Primary | ICD-10-CM

## 2018-04-19 DIAGNOSIS — F33.1 MODERATE EPISODE OF RECURRENT MAJOR DEPRESSIVE DISORDER: ICD-10-CM

## 2018-04-19 DIAGNOSIS — Z72.0 TOBACCO ABUSE: ICD-10-CM

## 2018-04-19 PROCEDURE — 99214 OFFICE O/P EST MOD 30 MIN: CPT | Mod: S$GLB,,, | Performed by: PHYSICIAN ASSISTANT

## 2018-04-19 PROCEDURE — 99214 OFFICE O/P EST MOD 30 MIN: CPT | Mod: S$GLB,,, | Performed by: INTERNAL MEDICINE

## 2018-04-19 PROCEDURE — 99999 PR PBB SHADOW E&M-EST. PATIENT-LVL III: CPT | Mod: PBBFAC,,, | Performed by: PHYSICIAN ASSISTANT

## 2018-04-19 RX ORDER — ALBUTEROL SULFATE 90 UG/1
2 AEROSOL, METERED RESPIRATORY (INHALATION) EVERY 6 HOURS PRN
Qty: 18 G | Refills: 0 | Status: SHIPPED | OUTPATIENT
Start: 2018-04-19 | End: 2019-04-19

## 2018-04-19 RX ORDER — METHYLPREDNISOLONE 4 MG/1
TABLET ORAL
Qty: 1 PACKAGE | Refills: 0 | Status: SHIPPED | OUTPATIENT
Start: 2018-04-19 | End: 2018-05-10

## 2018-04-19 RX ORDER — CLONAZEPAM 0.5 MG/1
0.5 TABLET ORAL
COMMUNITY
End: 2018-05-22

## 2018-04-19 RX ORDER — VARENICLINE TARTRATE 1 MG/1
1 TABLET, FILM COATED ORAL 2 TIMES DAILY
Qty: 60 TABLET | Refills: 5 | Status: SHIPPED | OUTPATIENT
Start: 2018-04-19

## 2018-04-19 RX ORDER — VARENICLINE TARTRATE 1 MG/1
1 TABLET, FILM COATED ORAL 2 TIMES DAILY
Qty: 60 TABLET | Refills: 5 | Status: SHIPPED | OUTPATIENT
Start: 2018-04-19 | End: 2018-04-19 | Stop reason: SDUPTHER

## 2018-04-19 RX ORDER — VARENICLINE TARTRATE 0.5 (11)-1
KIT ORAL
Qty: 1 PACKAGE | Refills: 0 | Status: SHIPPED | OUTPATIENT
Start: 2018-04-19 | End: 2018-04-19

## 2018-04-19 RX ORDER — AZITHROMYCIN 250 MG/1
TABLET, FILM COATED ORAL
Qty: 6 TABLET | Refills: 0 | Status: SHIPPED | OUTPATIENT
Start: 2018-04-19 | End: 2018-04-24

## 2018-04-19 NOTE — PROGRESS NOTES
Subjective:       Patient ID: Alley Chamorro is a 43 y.o. female.    Medication List with Changes/Refills   New Medications    ALBUTEROL 90 MCG/ACTUATION INHALER    Inhale 2 puffs into the lungs every 6 (six) hours as needed for Wheezing. Rescue    AZITHROMYCIN (Z-CHUCK) 250 MG TABLET    Take 2 tablets by mouth on day 1; Take 1 tablet by mouth on days 2-5    METHYLPREDNISOLONE (MEDROL DOSEPACK) 4 MG TABLET    use as directed    VARENICLINE (CHANTIX) 1 MG TAB    Take 1 tablet (1 mg total) by mouth 2 (two) times daily.   Current Medications    BACLOFEN (LIORESAL) 20 MG TABLET    Take 20 mg by mouth 3 (three) times daily.    BUSPIRONE (BUSPAR) 5 MG TAB    Take 1 tablet (5 mg total) by mouth 2 (two) times daily as needed.    CLONAZEPAM (KLONOPIN) 0.5 MG TABLET    Take 0.5 mg by mouth.    DULOXETINE (CYMBALTA) 60 MG CAPSULE    Take 1 capsule (60 mg total) by mouth 2 (two) times daily.    GABAPENTIN (NEURONTIN) 300 MG CAPSULE    Take 1 capsule (300 mg total) by mouth 3 (three) times daily.    MELOXICAM (MOBIC) 15 MG TABLET    Take 1 tablet (15 mg total) by mouth once daily.    OLANZAPINE (ZYPREXA) 10 MG TABLET    Take 1 tablet (10 mg total) by mouth every evening.    ROSUVASTATIN (CRESTOR) 40 MG TAB    Take 1 tablet (40 mg total) by mouth every evening.       Chief Complaint: Follow-up and Cough  She presents today with prolonged coughing that is not improving over the last 3 weeks. She denies congestion, fevers or ear pain. No sore throat. She does have some wheezing. She has COPD and continues to smoke. She complains of new chest tightness. She does not have any inhalers at home.  She has a cough productive of thick clear to whitish sputum. No blood.  She does not feel that she is getting better.      Review of Systems   Constitutional: Negative for activity change, appetite change, chills, fatigue and fever.   HENT: Negative for congestion, ear discharge, ear pain, mouth sores, postnasal drip, rhinorrhea, sinus  "pressure and sore throat.    Eyes: Negative for pain, discharge and redness.   Respiratory: Positive for cough, chest tightness and wheezing. Negative for shortness of breath.    Gastrointestinal: Negative for abdominal pain, constipation, diarrhea, nausea and vomiting.   Genitourinary: Negative for dysuria.   Musculoskeletal: Negative for arthralgias and neck stiffness.   Skin: Negative for rash.   Neurological: Negative for headaches.   Hematological: Negative for adenopathy.       Objective:      Vitals:    04/19/18 1005   BP: 102/62   BP Location: Left arm   Patient Position: Sitting   BP Method: Large (Manual)   Pulse: 83   Resp: 18   Temp: 98.3 °F (36.8 °C)   TempSrc: Oral   SpO2: 97%   Weight: 64.1 kg (141 lb 5 oz)   Height: 5' 5" (1.651 m)     Body mass index is 23.52 kg/m².  Physical Exam    General appearance: alert, no acute distress  Head: atraumatic  Eyes: PERRL, EMOI, normal conjunctiva, no drainage  Ears: tm normal with good visualization of landmarks, no erythema or pus, canals normal, external ear normal  Nose: normal mucosa, no polyps or sores, no rhinorrhea  Throat: no erythema, no exudates, tonsils appear normal  Mouth: no sores or lesion, moist mucous membranes  Neck: supple, FROM, no masses, no tenderness  Lymph: no posterior or cervical adenopathy  Lungs: no distress, no retractions, course breath sounds with decreased at right base, no wheezing, no rales, no rhonchi  Heart:: Regular rate and rhythm, no murmur  Abdomen: soft, non-tender, no guarding, no rebound, no peritoneal signs, bowel sounds normal, no hepatosplenomegaly, no masses  Skin: no rashes or lesion  Perfusion: good capillary refill, normal pulses      Assessment:       1. COPD exacerbation    2. Acute bronchitis with chronic obstructive pulmonary disease (COPD)    3. Tobacco abuse        Plan:       COPD exacerbation  Mild flare and will start a course of steroids.  Will send a rx for albuterol MDI to use 2 puffs as needed for " wheezing or chest tightness.   -     albuterol 90 mcg/actuation inhaler; Inhale 2 puffs into the lungs every 6 (six) hours as needed for Wheezing. Rescue  Dispense: 18 g; Refill: 0  -     methylPREDNISolone (MEDROL DOSEPACK) 4 mg tablet; use as directed  Dispense: 1 Package; Refill: 0    Acute bronchitis with chronic obstructive pulmonary disease (COPD)  Prolonged cough that is thickening with known COPD. Will start treatment with azithromycin.   -     azithromycin (Z-CHUCK) 250 MG tablet; Take 2 tablets by mouth on day 1; Take 1 tablet by mouth on days 2-5  Dispense: 6 tablet; Refill: 0    Tobacco abuse  I strongly advised her to stop smoking. Rx given for chantix today.   -    varenicline (CHANTIX CHUCK) 0.5 mg (11)- 1 mg (42) tablet; Take one 0.5mg tab by mouth once daily X3 days,then increase to one 0.5mg tab twice daily X4 days,then increase to one 1mg tab twice daily  Dispense: 1 Package; Refill: 0  -     varenicline (CHANTIX) 1 mg Tab; Take 1 tablet (1 mg total) by mouth 2 (two) times daily.  Dispense: 60 tablet; Refill: 5    Follow-up if symptoms worsen or fail to improve.

## 2018-04-23 NOTE — PROGRESS NOTES
Neurosurgery History & Physical    Patient ID: Alley Chamorro is a 43 y.o. female.    Chief Complaint   Patient presents with    Follow-up     Xray,MRI EMG, Scoliosis       Review of Systems   Constitutional: Negative for activity change, appetite change, chills, fever and unexpected weight change.   HENT: Negative for tinnitus, trouble swallowing and voice change.    Respiratory: Negative for apnea, cough, chest tightness and shortness of breath.    Cardiovascular: Negative for chest pain and palpitations.   Gastrointestinal: Negative for constipation, diarrhea, nausea and vomiting.   Genitourinary: Negative for difficulty urinating, dysuria, frequency and urgency.   Musculoskeletal: Positive for arthralgias, back pain, myalgias and neck pain. Negative for gait problem and neck stiffness.   Skin: Negative for wound.   Neurological: Positive for numbness. Negative for dizziness, tremors, seizures, facial asymmetry, speech difficulty, weakness, light-headedness and headaches.   Psychiatric/Behavioral: Negative for confusion and decreased concentration.       Past Medical History:   Diagnosis Date    Anxiety     Chronic back pain     Depression     Hyperlipidemia     Rheumatoid arthritis     Scoliosis      Social History     Social History    Marital status: Legally      Spouse name: N/A    Number of children: N/A    Years of education: N/A     Occupational History    disability due to low back      Social History Main Topics    Smoking status: Current Every Day Smoker     Packs/day: 1.00     Years: 30.00     Types: Cigarettes    Smokeless tobacco: Never Used    Alcohol use 1.2 oz/week     2 Shots of liquor per week    Drug use: Yes     Types: Marijuana      Comment: for pain    Sexual activity: Yes     Partners: Male     Other Topics Concern    Not on file     Social History Narrative    No narrative on file     Family History   Problem Relation Age of Onset    COPD Father      "Cancer Father     Diabetes Paternal Aunt     Diabetes Maternal Grandmother      Review of patient's allergies indicates:   Allergen Reactions    Aleve [naproxen sodium] Anaphylaxis and Itching       Current Outpatient Prescriptions:     albuterol 90 mcg/actuation inhaler, Inhale 2 puffs into the lungs every 6 (six) hours as needed for Wheezing. Rescue, Disp: 18 g, Rfl: 0    azithromycin (Z-CHUCK) 250 MG tablet, Take 2 tablets by mouth on day 1; Take 1 tablet by mouth on days 2-5, Disp: 6 tablet, Rfl: 0    baclofen (LIORESAL) 20 MG tablet, Take 20 mg by mouth 3 (three) times daily., Disp: , Rfl:     busPIRone (BUSPAR) 5 MG Tab, Take 1 tablet (5 mg total) by mouth 2 (two) times daily as needed., Disp: 60 tablet, Rfl: 11    clonazePAM (KLONOPIN) 0.5 MG tablet, Take 0.5 mg by mouth., Disp: , Rfl:     DULoxetine (CYMBALTA) 60 MG capsule, Take 1 capsule (60 mg total) by mouth 2 (two) times daily., Disp: 60 capsule, Rfl: 3    gabapentin (NEURONTIN) 300 MG capsule, Take 1 capsule (300 mg total) by mouth 3 (three) times daily., Disp: 90 capsule, Rfl: 3    meloxicam (MOBIC) 15 MG tablet, Take 1 tablet (15 mg total) by mouth once daily., Disp: 30 tablet, Rfl: 6    methylPREDNISolone (MEDROL DOSEPACK) 4 mg tablet, use as directed, Disp: 1 Package, Rfl: 0    OLANZapine (ZYPREXA) 10 MG tablet, Take 1 tablet (10 mg total) by mouth every evening., Disp: 30 tablet, Rfl: 11    rosuvastatin (CRESTOR) 40 MG Tab, Take 1 tablet (40 mg total) by mouth every evening., Disp: 90 tablet, Rfl: 3    varenicline (CHANTIX) 1 mg Tab, Take 1 tablet (1 mg total) by mouth 2 (two) times daily., Disp: 60 tablet, Rfl: 5    Vitals:    04/19/18 1304   BP: (!) 106/54   BP Location: Left arm   Patient Position: Sitting   BP Method: Medium (Manual)   Pulse: 80   Weight: 60.3 kg (132 lb 15 oz)   Height: 5' 5" (1.651 m)       Physical Exam   Constitutional: She is oriented to person, place, and time. She appears well-developed and well-nourished. "   HENT:   Head: Normocephalic and atraumatic.   Eyes: Pupils are equal, round, and reactive to light.   Neck: Normal range of motion. Neck supple.   Cardiovascular: Normal rate.    Pulmonary/Chest: Effort normal.   Musculoskeletal: Normal range of motion. She exhibits no edema.   Neurological: She is alert and oriented to person, place, and time. She has a normal Finger-Nose-Finger Test, a normal Heel to Shin Test, a normal Romberg Test and a normal Tandem Gait Test. Gait normal.   Reflex Scores:       Tricep reflexes are 2+ on the right side and 2+ on the left side.       Bicep reflexes are 2+ on the right side and 2+ on the left side.       Brachioradialis reflexes are 2+ on the right side and 2+ on the left side.       Patellar reflexes are 2+ on the right side and 2+ on the left side.       Achilles reflexes are 2+ on the right side and 2+ on the left side.  Skin: Skin is warm, dry and intact.   Psychiatric: She has a normal mood and affect. Her speech is normal and behavior is normal. Judgment and thought content normal.   Nursing note and vitals reviewed.      Neurologic Exam     Mental Status   Oriented to person, place, and time.   Oriented to person.   Oriented to place.   Oriented to time.   Follows 3 step commands.   Attention: normal. Concentration: normal.   Speech: speech is normal   Level of consciousness: alert  Knowledge: consistent with education.   Able to name object. Able to read. Able to repeat. Able to write. Normal comprehension.     Cranial Nerves     CN II   Visual acuity: normal  Right visual field deficit: none  Left visual field deficit: none     CN III, IV, VI   Pupils are equal, round, and reactive to light.  Right pupil: Size: 3 mm. Shape: regular. Reactivity: brisk. Consensual response: intact.   Left pupil: Size: 3 mm. Shape: regular. Reactivity: brisk. Consensual response: intact.   CN III: no CN III palsy  CN VI: no CN VI palsy  Nystagmus: none   Diplopia: none  Ophthalmoparesis:  none  Conjugate gaze: present    CN V   Right facial sensation deficit: none  Left facial sensation deficit: none    CN VII   Right facial weakness: none  Left facial weakness: none    CN VIII   Hearing: intact    CN IX, X   CN IX normal.   CN X normal.     CN XI   Right sternocleidomastoid strength: normal  Left sternocleidomastoid strength: normal  Right trapezius strength: normal  Left trapezius strength: normal    CN XII   Fasciculations: absent  Tongue deviation: none    Motor Exam   Muscle bulk: normal  Overall muscle tone: normal  Right arm pronator drift: absent  Left arm pronator drift: absent    Strength   Right neck flexion: 5/5  Left neck flexion: 5/5  Right neck extension: 5/5  Left neck extension: 5/5  Right deltoid: 5/5  Left deltoid: 5/5  Right biceps: 5/5  Left biceps: 5/5  Right triceps: 5/5  Left triceps: 5/5  Right wrist flexion: 5/5  Left wrist flexion: 5/5  Right wrist extension: 5/5  Left wrist extension: 5/5  Right interossei: 5/5  Left interossei: 5/5  Right abdominals: 5/5  Left abdominals: 5/5  Right iliopsoas: 5/5  Left iliopsoas: 5/5  Right quadriceps: 5/5  Left quadriceps: 5/5  Right hamstrin/5  Left hamstrin/5  Right glutei: 5/5  Left glutei: 5/5  Right anterior tibial: 5/5  Left anterior tibial: 5/5  Right posterior tibial: 5/5  Left posterior tibial: 5/5  Right peroneal: 5/5  Left peroneal: 5/5  Right gastroc: 5/5  Left gastroc: 5/5    Sensory Exam   Right arm light touch: normal  Left arm light touch: normal  Right leg light touch: normal  Left leg light touch: normal  Right arm vibration: normal  Left arm vibration: normal  Right arm pinprick: normal  Left arm pinprick: normal    Gait, Coordination, and Reflexes     Gait  Gait: normal    Coordination   Romberg: negative  Finger to nose coordination: normal  Heel to shin coordination: normal  Tandem walking coordination: normal    Tremor   Resting tremor: absent  Intention tremor: absent  Action tremor: absent    Reflexes  "  Right brachioradialis: 2+  Left brachioradialis: 2+  Right biceps: 2+  Left biceps: 2+  Right triceps: 2+  Left triceps: 2+  Right patellar: 2+  Left patellar: 2+  Right achilles: 2+  Left achilles: 2+  Right Montoya: absent  Left Montoya: absent  Right ankle clonus: absent  Left ankle clonus: absent      Provider dictation:  43 year old female was initially referred by Renate De La O for evaluation of back pain and presents for follow up today after undergoing imaging and nerve testing.  There have been no changes since her last visit.  Per my last note:  "She has a history of chronic neck and back pain and depression.  She also reports a history of scoliosis, but there is no record of scoliosis in her chart.  She describes pain across the lower back with radiation superiorly up the spine to the neck and inferiorly to the bilateral legs in a generalized distribution.  It is associated with numbness and tingling.  She is taking gabapentin and meloxicam.  She recalls PT and ILA in the past."  Oswestry score: 46%.  PHQ:  8.    She is neurologically intact on exam without any focal deficits noted.    I have reviewed xrays and an MRI lumbar spine demonstrating:  Mild scoliosis with an 8degree dextro curve from the superior of L3 to the inferior of L5 wit neurtral sagital balance.  There are mild degenerative chagnes in the lumbar spine without evidence of instability on flexion/ extension.  There is  Mild lumbar spondylosis at L4/5 with mild DDD and no significant central canal or foraminal narrowing.    EMG/ NCV from 4-7-18 did not find evidence of any active radiculopathy.    Per previous records review:  MRI brain 7/24/15:  Normal  EMG/ NCV upper extremities 7-16-15:  Left carpal tunnel  MRI c-spine 7/24/15:  C5/6 cervical spondylosis with moderate foraminal narrowing  CT myelogram C-spine 7/22/15:  No significant abnormality or foraminal narrowing.  MRI lumbar spine 4-20-16:  L4/5 spondylosis with bilateral foraminal " narrowing.  MRI lumbar spine 4-29-16:  No significant abnormality.    Ms. Chamorro has chronic lower back and generalized bilateral leg pain/ numbness in a non-focal dermatomal distribution.  She has mild degenerative changes without focal neural compression and no evidence of active lumbar radicopathy on nerve testing; therefoe pain is most likely myofascial and complicated by long standing depression.  Surgical intervention is not recommended.  I recommend PT and interventions with pain management.  She will think about it and let us know what she wants to do.    Visit Diagnosis:  Chronic bilateral low back pain with bilateral sciatica    Lumbar spondylosis    Scoliosis, unspecified scoliosis type, unspecified spinal region    Moderate episode of recurrent major depressive disorder        Total time spent counseling greater than fifty percent of total visit time.  Counseling included discussion regarding imaging findings, diagnosis possibilities, treatment options, risks and benefits.   The patient had many questions regarding the options and long-term effects.

## 2018-04-24 ENCOUNTER — OFFICE VISIT (OUTPATIENT)
Dept: PAIN MEDICINE | Facility: CLINIC | Age: 43
End: 2018-04-24
Payer: MEDICARE

## 2018-04-24 VITALS
BODY MASS INDEX: 23.42 KG/M2 | TEMPERATURE: 98 F | RESPIRATION RATE: 18 BRPM | WEIGHT: 140.56 LBS | SYSTOLIC BLOOD PRESSURE: 103 MMHG | HEART RATE: 73 BPM | OXYGEN SATURATION: 100 % | DIASTOLIC BLOOD PRESSURE: 57 MMHG | HEIGHT: 65 IN

## 2018-04-24 DIAGNOSIS — M51.36 DDD (DEGENERATIVE DISC DISEASE), LUMBAR: Primary | ICD-10-CM

## 2018-04-24 DIAGNOSIS — M50.30 DDD (DEGENERATIVE DISC DISEASE), CERVICAL: ICD-10-CM

## 2018-04-24 DIAGNOSIS — M54.16 BILATERAL LUMBAR RADICULOPATHY: ICD-10-CM

## 2018-04-24 DIAGNOSIS — M54.2 NECK PAIN: ICD-10-CM

## 2018-04-24 DIAGNOSIS — M54.16 LUMBAR RADICULOPATHY: ICD-10-CM

## 2018-04-24 DIAGNOSIS — M47.816 LUMBAR SPONDYLOSIS: ICD-10-CM

## 2018-04-24 DIAGNOSIS — M54.12 CERVICAL RADICULOPATHY: ICD-10-CM

## 2018-04-24 PROCEDURE — 99205 OFFICE O/P NEW HI 60 MIN: CPT | Mod: S$GLB,,, | Performed by: ANESTHESIOLOGY

## 2018-04-24 PROCEDURE — 99999 PR PBB SHADOW E&M-EST. PATIENT-LVL V: CPT | Mod: PBBFAC,,, | Performed by: ANESTHESIOLOGY

## 2018-04-24 RX ORDER — ALPRAZOLAM 0.5 MG/1
1 TABLET, ORALLY DISINTEGRATING ORAL ONCE AS NEEDED
Status: CANCELLED | OUTPATIENT
Start: 2018-05-15 | End: 2018-05-15

## 2018-04-24 NOTE — LETTER
April 26, 2018      Renate De La O DO  [address] [address]  [address]  [facility] [address]           [facility]  [address] [facility] [address] [place] [address]  Phone: [phone]  Fax: [phone]          Patient: Alley Chamorro   MR Number: [id number]   YOB: 1975   Date of Visit: 4/24/2018       Dear Dr. Renate De La O:    Thank you for referring Alley Chamorro to me for evaluation. Attached you will find relevant portions of my assessment and plan of care.    If you have questions, please do not hesitate to call me. I look forward to following Alley Chamorro along with you.    Sincerely,    Catracho Maynard MD    Enclosure  CC:  No Recipients    If you would like to receive this communication electronically, please contact [email or web address] or [phone] to request more information on [employer] access.    For providers and/or their staff who would like to refer a patient to [facility], please contact us through our one-stop-shop provider referral line, Ruth Moe, at [phone].    If you feel you have received this communication in error or would no longer like to receive these types of communications, please e-mail [email or web address]

## 2018-04-24 NOTE — PROGRESS NOTES
This note was completed with dictation software and grammatical errors may exist.    CC: Neck pain, headaches, bilateral arm pain and upper back pain, low back pain and right greater than left leg pain    HPI: The patient is a 43-year-old woman with a history of anxiety, tobacco use, chronic neck and back pain who presents in referral from Dr. Reante De La O for continued pain.  The patient reports having pain throughout her body for one half of her life, denies any specific trauma that may have caused this.  It sounds like the worst pain is in her low back and in her posterior right leg into the lower leg and foot.  She reports that the pain in the legs is aching, burning, throbbing, grabbing, tight, tingling, numb, sharp, shooting, hot and cold.  It radiates out her hips, particularly worse on the right side.  She has some pain radiating into her anterior thighs at times.  The pain is worse with sitting, standing, bending, walking, coughing, extending or flexing, lifting or getting out of a bed or a chair.  She does get relief with rest, laying down, heat, cold and medications.    In terms of her neck pain, this is located the cervical spine base, reports pain and numbness and tingling radiating throughout her bilateral arms and into her bilateral scapula.  This is worse with activity, improved with rest.  She denies any carisa weakness, no bowel or bladder incontinence.  Next    Pain intervention history: She reports seeing physicians over the years, including pain management physicians and has undergone numerous different types of injections which sound like epidural steroid injections and radiofrequency ablations, all without much benefit.  She has tried numerous medications including hydrocodone with no relief, gabapentin and Lyrica with no major benefit. She is currently taking Modic 15 mg once daily, baclofen 20 mg 3 times a day neither with much benefit.  She is also taking Cymbalta 60 mg twice a day and had  "been taking gabapentin 300 mg 3 times a day without much benefit.  She states that she uses marijuana on a daily basis which provides better relief than any medication she has had.    ROS: She reports weight gain, headaches, joint stiffness, back pain, difficulty sleeping, anxiety and depression.  Balance of review of systems is negative.    Past Medical History:   Diagnosis Date    Anxiety     Chronic back pain     Depression     Hyperlipidemia     Rheumatoid arthritis     Scoliosis        Past Surgical History:   Procedure Laterality Date    HYSTERECTOMY  age 23    endometriosis, left oopherectomy, 2015 right oopherectomy due to cysts    LEG SURGERY Left     congenital deformity done age 18 months    SHOULDER SURGERY Bilateral     "I could not use my arm", "something was out of place"       Social History     Social History    Marital status: Legally      Spouse name: N/A    Number of children: N/A    Years of education: N/A     Occupational History    disability due to low back      Social History Main Topics    Smoking status: Current Every Day Smoker     Packs/day: 1.00     Years: 30.00     Types: Cigarettes    Smokeless tobacco: Never Used    Alcohol use 1.2 oz/week     2 Shots of liquor per week    Drug use: Yes     Types: Marijuana      Comment: for pain    Sexual activity: Yes     Partners: Male     Other Topics Concern    None     Social History Narrative    None         Medications/Allergies: See med card    Vitals:    04/24/18 1017   BP: (!) 103/57   Pulse: 73   Resp: 18   Temp: 98.2 °F (36.8 °C)   TempSrc: Oral   SpO2: 100%   Weight: 63.7 kg (140 lb 8.7 oz)   Height: 5' 5" (1.651 m)   PainSc:   8   PainLoc: Back         Physical exam:  Gen: A and O x3, pleasant, well-groomed  Skin: No rashes or obvious lesions  HEENT: PERRLA, no obvious deformities on ears or in canals. Trachea midline.  CVS: Regular rate and rhythm, normal palpable pulses.  Resp:No increased work of " breathing, symmetrical chest rise.  Abdomen: Soft, NT/ND.  Musculoskeletal:No antalgic gait.     Neuro:  Lower extremities: 5/5 strength bilaterally  Reflexes: Patellar 3+, Achilles 2+ bilaterally.  Sensory:  Intact and symmetrical to light touch and pinprick in L2-S1 dermatomes bilaterally.    Lumbar spine:  Lumbar spine: Range of motion is mildly reduced with extension with mild increased pain, moderately reduced with flexion with increased pain bilaterally.  Edinson's test causes no increased pain on either side.    Supine straight leg raise is positive for back and leg pain, particularly on the right side at 60° with right posterior thigh pain.  Internal and external rotation of the hip causes no increased pain on either side.  Myofascial exam: Mild tenderness to palpation across lumbar paraspinous muscles.    Neuro:  Upper extremities: 5/5 strength bilaterally   Reflexes: Brachioradialis 2+, Bicep 2+, Tricep 2+.   Sensory: Intact and symmetrical to light touch and pinprick in C2-T1 dermatomes bilaterally.    Cervical Spine:  Cervical spine: Range of motion is mildly decreased with flexion and extension and lateral rotation with increased pain on extension and oblique extension either side.  Spurling's maneuver causes neck pain to either side.  Myofascial exam:  Tenderness to palpation across cervical paraspinous region bilaterally.      Imaging:  3/7/18 MRI L-spine  the T11-12 level but there is no spinal canal or foraminal stenosis.  There is no cord compression.  T12-L1: There is no spinal canal or significant foraminal stenosis.  L1-2: There is no spinal canal or significant foraminal stenosis.  L2-3: There is minimal bulging of the annulus.  There is no spinal canal or significant foraminal stenosis.  L3-4: There is minimal bulging of the annulus and mild facet joint arthropathy.  There is no spinal canal or significant foraminal stenosis.  L4-5: The disc is desiccated.  There is a diffuse disc bulge with  "superimposed broad shallow central and right paracentral disc protrusion with annular fissure.  There is mild facet joint arthropathy.  There is borderline spinal stenosis.  There is crowding of the lateral recess bilaterally, right greater than left.  This could affect the L5 roots, right greater than left.  There is mild foraminal stenosis without suspected nerve root compression.  L5-S1: There is mild facet joint arthropathy.  There is a very shallow central disc protrusion with subtle annular fissure which does not obviously contact, displace or compress the S1 roots.  Soft tissues, other: The prevertebral soft tissues are normal.  The aorta is normal in caliber.    3/7/18 Xray L-spine scoliosis survery:  There are 6 non rib bearing vertebral bodies in the lumbar region.  There is a very gentle dextrocurvature.  There is approximately 8-9 degree dextrocurvature of the mid lumbar spine when using superior endplate of L3 and inferior endplate of L5 for reference.  There is a neutral sagittal balance.    From Shaina Contreras note  EMG/ NCV from 4-7-18 did not find evidence of any active radiculopathy.     Per previous records review:  "MRI brain 7/24/15:  Normal  EMG/ NCV upper extremities 7-16-15:  Left carpal tunnel  MRI c-spine 7/24/15:  C5/6 cervical spondylosis with moderate foraminal narrowing  CT myelogram C-spine 7/22/15:  No significant abnormality or foraminal narrowing.  MRI lumbar spine 4-20-16:  L4/5 spondylosis with bilateral foraminal narrowing.  MRI lumbar spine 4-29-16:  No significant abnormality."    1. 4/17/18 EMG: Needle EMG findings are isolated to the right lumbar paraspinal musculature.  This by itself is insufficient for diagnosis of lumbosacral radiculopathy.  This may be indicative of lumbosacral radiculitis, or perhaps early radiculopathy.  Clinical correlation is required. There is insufficient electrodiagnostic evidence for diagnoses of peripheral polyneuropathy, focal mononeuropathy, " myopathy, or lumbosacral radiculopathy/plexopathy of the bilateral lower extremities.    Assessment:  The patient is a 43-year-old woman with a history of anxiety, tobacco use, chronic neck and back pain who presents in referral from Dr. Renate De La O for continued pain.  1. DDD (degenerative disc disease), lumbar     2. Bilateral lumbar radiculopathy  Vital signs    Activity as tolerated    Verify informed consent    Notify physician     Notify physician     Notify physician (specify)    Diet NPO    Case Request Operating Room: ILA-TRANSFORAMINAL L4/5    Place in Outpatient    alprazolam ODT dissolvable tablet 1 mg   3. Lumbar spondylosis     4. DDD (degenerative disc disease), cervical     5. Cervical radiculopathy  MRI Cervical Spine Without Contrast   6. Neck pain  MRI Cervical Spine Without Contrast   7. Lumbar radiculopathy         Plan:  1.  In terms of her pain, she states that the back pain and leg pain are the worst at this point compared to the neck.  She has been seen by neurosurgery, Shaina IVORY and they did not recommend any surgery at this point.  However, she does have pain across the back and into the right leg and this would seem to correspond with her lateral recess narrowing at L4/5 and also facet arthropathy.  I explained that while an epidural steroid injection may not provide any long-term benefit, I think it would be worthwhile to try bilateral L4/5 selective nerve root blocks to see if she can actually have some diagnostic benefit.  If she does have relief with this injection, possible surgery in the future versus spinal cord stimulation may be an option.  She agrees with this plan and we will set up a bilateral L4/5 transforaminal injection and have her follow-up in several weeks after the injection.  2.  In terms of her neck pain we do not have any recent imaging and so we will get a new cervical spine MRI to evaluate and we can discuss this in follow-up.    Greater than 50% of this  60min visit was spent educating and counseling this patient.

## 2018-05-10 ENCOUNTER — HOSPITAL ENCOUNTER (OUTPATIENT)
Dept: RADIOLOGY | Facility: HOSPITAL | Age: 43
Discharge: HOME | End: 2018-05-10
Attending: ANESTHESIOLOGY
Payer: MEDICARE

## 2018-05-10 DIAGNOSIS — M54.2 NECK PAIN: ICD-10-CM

## 2018-05-10 DIAGNOSIS — M54.12 CERVICAL RADICULOPATHY: ICD-10-CM

## 2018-05-10 PROCEDURE — 72141 MRI NECK SPINE W/O DYE: CPT | Mod: TC,PO

## 2018-05-10 PROCEDURE — 72141 MRI NECK SPINE W/O DYE: CPT | Mod: 26,,, | Performed by: RADIOLOGY

## 2018-05-11 NOTE — OR NURSING
"Spoke with pt regarding pre-op for procedure with Dr. Maynard 5/15/15.  Michael completed antibiotics and medrol dose pack 4/19/18.  Stated she completed both meds and is feeling "fully recovered."  NM  "

## 2018-05-14 DIAGNOSIS — M51.36 DDD (DEGENERATIVE DISC DISEASE), LUMBAR: Primary | ICD-10-CM

## 2018-05-15 ENCOUNTER — HOSPITAL ENCOUNTER (OUTPATIENT)
Dept: RADIOLOGY | Facility: HOSPITAL | Age: 43
Discharge: HOME | End: 2018-05-15
Attending: ANESTHESIOLOGY
Payer: MEDICARE

## 2018-05-15 ENCOUNTER — HOSPITAL ENCOUNTER (OUTPATIENT)
Facility: HOSPITAL | Age: 43
Discharge: HOME | End: 2018-05-15
Attending: ANESTHESIOLOGY | Admitting: ANESTHESIOLOGY
Payer: MEDICARE

## 2018-05-15 ENCOUNTER — SURGERY (OUTPATIENT)
Age: 43
End: 2018-05-15

## 2018-05-15 VITALS
HEART RATE: 76 BPM | TEMPERATURE: 99 F | RESPIRATION RATE: 15 BRPM | DIASTOLIC BLOOD PRESSURE: 55 MMHG | BODY MASS INDEX: 23.49 KG/M2 | SYSTOLIC BLOOD PRESSURE: 106 MMHG | OXYGEN SATURATION: 97 % | WEIGHT: 141 LBS | HEIGHT: 65 IN

## 2018-05-15 DIAGNOSIS — M51.36 DDD (DEGENERATIVE DISC DISEASE), LUMBAR: ICD-10-CM

## 2018-05-15 DIAGNOSIS — M54.16 LUMBAR RADICULOPATHY: Primary | ICD-10-CM

## 2018-05-15 DIAGNOSIS — M54.16 BILATERAL LUMBAR RADICULOPATHY: ICD-10-CM

## 2018-05-15 PROCEDURE — 64483 NJX AA&/STRD TFRM EPI L/S 1: CPT | Mod: 50,PO | Performed by: ANESTHESIOLOGY

## 2018-05-15 PROCEDURE — 64483 NJX AA&/STRD TFRM EPI L/S 1: CPT | Mod: 50,,, | Performed by: ANESTHESIOLOGY

## 2018-05-15 PROCEDURE — 25000003 PHARM REV CODE 250: Mod: PO | Performed by: ANESTHESIOLOGY

## 2018-05-15 PROCEDURE — 63600175 PHARM REV CODE 636 W HCPCS: Mod: PO | Performed by: ANESTHESIOLOGY

## 2018-05-15 PROCEDURE — 76000 FLUOROSCOPY <1 HR PHYS/QHP: CPT | Mod: TC,PO

## 2018-05-15 PROCEDURE — 25500020 PHARM REV CODE 255: Mod: PO | Performed by: ANESTHESIOLOGY

## 2018-05-15 RX ORDER — ALPRAZOLAM 0.5 MG/1
1 TABLET, ORALLY DISINTEGRATING ORAL ONCE AS NEEDED
Status: COMPLETED | OUTPATIENT
Start: 2018-05-15 | End: 2018-05-15

## 2018-05-15 RX ORDER — LIDOCAINE HYDROCHLORIDE 10 MG/ML
INJECTION, SOLUTION EPIDURAL; INFILTRATION; INTRACAUDAL; PERINEURAL
Status: DISCONTINUED | OUTPATIENT
Start: 2018-05-15 | End: 2018-05-15 | Stop reason: HOSPADM

## 2018-05-15 RX ORDER — BUPIVACAINE HYDROCHLORIDE 2.5 MG/ML
INJECTION, SOLUTION EPIDURAL; INFILTRATION; INTRACAUDAL
Status: DISCONTINUED | OUTPATIENT
Start: 2018-05-15 | End: 2018-05-15 | Stop reason: HOSPADM

## 2018-05-15 RX ORDER — METHYLPREDNISOLONE ACETATE 80 MG/ML
INJECTION, SUSPENSION INTRA-ARTICULAR; INTRALESIONAL; INTRAMUSCULAR; SOFT TISSUE
Status: DISCONTINUED | OUTPATIENT
Start: 2018-05-15 | End: 2018-05-15 | Stop reason: HOSPADM

## 2018-05-15 RX ADMIN — LIDOCAINE HYDROCHLORIDE 10 ML: 10 INJECTION, SOLUTION EPIDURAL; INFILTRATION; INTRACAUDAL; PERINEURAL at 01:05

## 2018-05-15 RX ADMIN — METHYLPREDNISOLONE ACETATE 80 MG: 80 INJECTION, SUSPENSION INTRA-ARTICULAR; INTRALESIONAL; INTRAMUSCULAR; SOFT TISSUE at 01:05

## 2018-05-15 RX ADMIN — IOHEXOL 3 ML: 300 INJECTION, SOLUTION INTRAVENOUS at 01:05

## 2018-05-15 RX ADMIN — ALPRAZOLAM 0.5 MG: 0.5 TABLET, ORALLY DISINTEGRATING ORAL at 12:05

## 2018-05-15 RX ADMIN — BUPIVACAINE HYDROCHLORIDE 3 ML: 2.5 INJECTION, SOLUTION EPIDURAL; INFILTRATION; INTRACAUDAL; PERINEURAL at 01:05

## 2018-05-15 NOTE — OP NOTE
PROCEDURE DATE: 5/15/2018    PROCEDURE: Bilateral L4/5 transforaminal epidural steroid injection under fluoroscopy    DIAGNOSIS: Lumbar  Radiculopathy    Post op diagnosis: Same    PHYSICIAN: Catracho Maynard MD    MEDICATIONS INJECTED:  Methylprednisolone 40mg (0.5ml) and 1.5ml 0.25% bupivicaine at each nerve root.     LOCAL ANESTHETIC INJECTED:  Lidocaine 1%. 4 ml per site.    SEDATION MEDICATIONS: none    ESTIMATED BLOOD LOSS:  none    COMPLICATIONS:  none    TECHNIQUE:   A time-out was taken to identify patient and procedure side prior to starting the procedure. The patient was placed in a prone position, prepped and draped in the usual sterile fashion using ChloraPrep and sterile towels.  The area to be injected was determined under fluoroscopic guidance in AP and oblique view.  Local anesthetic was given by raising a wheal and going down to the hub of a 25-gauge 1.5 inch needle.  In oblique view, a 3.5 inch 22-gauge bent-tip spinal needle was introduced towards 6 oclock position of the pedicle of each above named nerve root level.  The needle was walked medially then hinged into the neural foramen and position was confirmed in AP and lateral views.  Omnipaque contrast dye was injected to confirm appropriate placement and that there was no vascular uptake.  After negative aspiration for blood or CSF, the medication was then injected. This was performed at the right and then left L4/5 level(s). The patient tolerated the procedure well.    The patient was monitored after the procedure.  Patient was given post procedure and discharge instructions to follow at home. The patient was discharged in a stable condition.

## 2018-05-15 NOTE — H&P (VIEW-ONLY)
This note was completed with dictation software and grammatical errors may exist.    CC: Neck pain, headaches, bilateral arm pain and upper back pain, low back pain and right greater than left leg pain    HPI: The patient is a 43-year-old woman with a history of anxiety, tobacco use, chronic neck and back pain who presents in referral from Dr. Renate De La O for continued pain.  The patient reports having pain throughout her body for one half of her life, denies any specific trauma that may have caused this.  It sounds like the worst pain is in her low back and in her posterior right leg into the lower leg and foot.  She reports that the pain in the legs is aching, burning, throbbing, grabbing, tight, tingling, numb, sharp, shooting, hot and cold.  It radiates out her hips, particularly worse on the right side.  She has some pain radiating into her anterior thighs at times.  The pain is worse with sitting, standing, bending, walking, coughing, extending or flexing, lifting or getting out of a bed or a chair.  She does get relief with rest, laying down, heat, cold and medications.    In terms of her neck pain, this is located the cervical spine base, reports pain and numbness and tingling radiating throughout her bilateral arms and into her bilateral scapula.  This is worse with activity, improved with rest.  She denies any carisa weakness, no bowel or bladder incontinence.  Next    Pain intervention history: She reports seeing physicians over the years, including pain management physicians and has undergone numerous different types of injections which sound like epidural steroid injections and radiofrequency ablations, all without much benefit.  She has tried numerous medications including hydrocodone with no relief, gabapentin and Lyrica with no major benefit. She is currently taking Modic 15 mg once daily, baclofen 20 mg 3 times a day neither with much benefit.  She is also taking Cymbalta 60 mg twice a day and had  "been taking gabapentin 300 mg 3 times a day without much benefit.  She states that she uses marijuana on a daily basis which provides better relief than any medication she has had.    ROS: She reports weight gain, headaches, joint stiffness, back pain, difficulty sleeping, anxiety and depression.  Balance of review of systems is negative.    Past Medical History:   Diagnosis Date    Anxiety     Chronic back pain     Depression     Hyperlipidemia     Rheumatoid arthritis     Scoliosis        Past Surgical History:   Procedure Laterality Date    HYSTERECTOMY  age 23    endometriosis, left oopherectomy, 2015 right oopherectomy due to cysts    LEG SURGERY Left     congenital deformity done age 18 months    SHOULDER SURGERY Bilateral     "I could not use my arm", "something was out of place"       Social History     Social History    Marital status: Legally      Spouse name: N/A    Number of children: N/A    Years of education: N/A     Occupational History    disability due to low back      Social History Main Topics    Smoking status: Current Every Day Smoker     Packs/day: 1.00     Years: 30.00     Types: Cigarettes    Smokeless tobacco: Never Used    Alcohol use 1.2 oz/week     2 Shots of liquor per week    Drug use: Yes     Types: Marijuana      Comment: for pain    Sexual activity: Yes     Partners: Male     Other Topics Concern    None     Social History Narrative    None         Medications/Allergies: See med card    Vitals:    04/24/18 1017   BP: (!) 103/57   Pulse: 73   Resp: 18   Temp: 98.2 °F (36.8 °C)   TempSrc: Oral   SpO2: 100%   Weight: 63.7 kg (140 lb 8.7 oz)   Height: 5' 5" (1.651 m)   PainSc:   8   PainLoc: Back         Physical exam:  Gen: A and O x3, pleasant, well-groomed  Skin: No rashes or obvious lesions  HEENT: PERRLA, no obvious deformities on ears or in canals. Trachea midline.  CVS: Regular rate and rhythm, normal palpable pulses.  Resp:No increased work of " breathing, symmetrical chest rise.  Abdomen: Soft, NT/ND.  Musculoskeletal:No antalgic gait.     Neuro:  Lower extremities: 5/5 strength bilaterally  Reflexes: Patellar 3+, Achilles 2+ bilaterally.  Sensory:  Intact and symmetrical to light touch and pinprick in L2-S1 dermatomes bilaterally.    Lumbar spine:  Lumbar spine: Range of motion is mildly reduced with extension with mild increased pain, moderately reduced with flexion with increased pain bilaterally.  Edinson's test causes no increased pain on either side.    Supine straight leg raise is positive for back and leg pain, particularly on the right side at 60° with right posterior thigh pain.  Internal and external rotation of the hip causes no increased pain on either side.  Myofascial exam: Mild tenderness to palpation across lumbar paraspinous muscles.    Neuro:  Upper extremities: 5/5 strength bilaterally   Reflexes: Brachioradialis 2+, Bicep 2+, Tricep 2+.   Sensory: Intact and symmetrical to light touch and pinprick in C2-T1 dermatomes bilaterally.    Cervical Spine:  Cervical spine: Range of motion is mildly decreased with flexion and extension and lateral rotation with increased pain on extension and oblique extension either side.  Spurling's maneuver causes neck pain to either side.  Myofascial exam:  Tenderness to palpation across cervical paraspinous region bilaterally.      Imaging:  3/7/18 MRI L-spine  the T11-12 level but there is no spinal canal or foraminal stenosis.  There is no cord compression.  T12-L1: There is no spinal canal or significant foraminal stenosis.  L1-2: There is no spinal canal or significant foraminal stenosis.  L2-3: There is minimal bulging of the annulus.  There is no spinal canal or significant foraminal stenosis.  L3-4: There is minimal bulging of the annulus and mild facet joint arthropathy.  There is no spinal canal or significant foraminal stenosis.  L4-5: The disc is desiccated.  There is a diffuse disc bulge with  "superimposed broad shallow central and right paracentral disc protrusion with annular fissure.  There is mild facet joint arthropathy.  There is borderline spinal stenosis.  There is crowding of the lateral recess bilaterally, right greater than left.  This could affect the L5 roots, right greater than left.  There is mild foraminal stenosis without suspected nerve root compression.  L5-S1: There is mild facet joint arthropathy.  There is a very shallow central disc protrusion with subtle annular fissure which does not obviously contact, displace or compress the S1 roots.  Soft tissues, other: The prevertebral soft tissues are normal.  The aorta is normal in caliber.    3/7/18 Xray L-spine scoliosis survery:  There are 6 non rib bearing vertebral bodies in the lumbar region.  There is a very gentle dextrocurvature.  There is approximately 8-9 degree dextrocurvature of the mid lumbar spine when using superior endplate of L3 and inferior endplate of L5 for reference.  There is a neutral sagittal balance.    From Shaina Contreras note  EMG/ NCV from 4-7-18 did not find evidence of any active radiculopathy.     Per previous records review:  "MRI brain 7/24/15:  Normal  EMG/ NCV upper extremities 7-16-15:  Left carpal tunnel  MRI c-spine 7/24/15:  C5/6 cervical spondylosis with moderate foraminal narrowing  CT myelogram C-spine 7/22/15:  No significant abnormality or foraminal narrowing.  MRI lumbar spine 4-20-16:  L4/5 spondylosis with bilateral foraminal narrowing.  MRI lumbar spine 4-29-16:  No significant abnormality."    1. 4/17/18 EMG: Needle EMG findings are isolated to the right lumbar paraspinal musculature.  This by itself is insufficient for diagnosis of lumbosacral radiculopathy.  This may be indicative of lumbosacral radiculitis, or perhaps early radiculopathy.  Clinical correlation is required. There is insufficient electrodiagnostic evidence for diagnoses of peripheral polyneuropathy, focal mononeuropathy, " myopathy, or lumbosacral radiculopathy/plexopathy of the bilateral lower extremities.    Assessment:  The patient is a 43-year-old woman with a history of anxiety, tobacco use, chronic neck and back pain who presents in referral from Dr. Renate De La O for continued pain.  1. DDD (degenerative disc disease), lumbar     2. Bilateral lumbar radiculopathy  Vital signs    Activity as tolerated    Verify informed consent    Notify physician     Notify physician     Notify physician (specify)    Diet NPO    Case Request Operating Room: ILA-TRANSFORAMINAL L4/5    Place in Outpatient    alprazolam ODT dissolvable tablet 1 mg   3. Lumbar spondylosis     4. DDD (degenerative disc disease), cervical     5. Cervical radiculopathy  MRI Cervical Spine Without Contrast   6. Neck pain  MRI Cervical Spine Without Contrast   7. Lumbar radiculopathy         Plan:  1.  In terms of her pain, she states that the back pain and leg pain are the worst at this point compared to the neck.  She has been seen by neurosurgery, Shaina IVORY and they did not recommend any surgery at this point.  However, she does have pain across the back and into the right leg and this would seem to correspond with her lateral recess narrowing at L4/5 and also facet arthropathy.  I explained that while an epidural steroid injection may not provide any long-term benefit, I think it would be worthwhile to try bilateral L4/5 selective nerve root blocks to see if she can actually have some diagnostic benefit.  If she does have relief with this injection, possible surgery in the future versus spinal cord stimulation may be an option.  She agrees with this plan and we will set up a bilateral L4/5 transforaminal injection and have her follow-up in several weeks after the injection.  2.  In terms of her neck pain we do not have any recent imaging and so we will get a new cervical spine MRI to evaluate and we can discuss this in follow-up.    Greater than 50% of this  60min visit was spent educating and counseling this patient.

## 2018-05-15 NOTE — DISCHARGE SUMMARY
[facility]  Discharge Note  Short Stay    Admit Date: 5/15/2018    Discharge Date: 5/15/2018    Attending Physician: Catracho Maynard MD     Discharge Provider: Catracho Maynard    Diagnoses:  Active Hospital Problems    Diagnosis  POA    *Lumbar radiculopathy [M54.16]  Yes      Resolved Hospital Problems    Diagnosis Date Resolved POA   No resolved problems to display.       Discharged Condition: good    Final Diagnoses: Bilateral lumbar radiculopathy [M54.16]    Disposition: Home or Self Care    Hospital Course: no complications, uneventful    Outcome of Hospitalization, Treatment, Procedure, or Surgery:  Patient was admitted for outpatient procedure. The patient underwent procedure without complications and are discharged home    Follow up/Patient Instructions:  Follow up as scheduled/Patient has received instructions and follow up date    Medications:  Continue previous medications      Discharge Procedure Orders  Call MD for:  temperature >100.4     Call MD for:  severe uncontrolled pain     Call MD for:  redness, tenderness, or signs of infection (pain, swelling, redness, odor or green/yellow discharge around incision site)     Call MD for:  severe persistent headache     No dressing needed           Discharge Procedure Orders (must include Diet, Follow-up, Activity):    Discharge Procedure Orders (must include Diet, Follow-up, Activity)  Call MD for:  temperature >100.4     Call MD for:  severe uncontrolled pain     Call MD for:  redness, tenderness, or signs of infection (pain, swelling, redness, odor or green/yellow discharge around incision site)     Call MD for:  severe persistent headache     No dressing needed

## 2018-05-21 ENCOUNTER — LAB VISIT (OUTPATIENT)
Dept: LAB | Facility: HOSPITAL | Age: 43
End: 2018-05-21
Attending: INTERNAL MEDICINE
Payer: MEDICARE

## 2018-05-21 DIAGNOSIS — E78.5 HYPERLIPIDEMIA, UNSPECIFIED HYPERLIPIDEMIA TYPE: ICD-10-CM

## 2018-05-21 LAB
ALBUMIN SERPL BCP-MCNC: 3.5 G/DL
ALP SERPL-CCNC: 140 U/L
ALT SERPL W/O P-5'-P-CCNC: 13 U/L
ANION GAP SERPL CALC-SCNC: 10 MMOL/L
AST SERPL-CCNC: 16 U/L
BILIRUB SERPL-MCNC: 0.6 MG/DL
BUN SERPL-MCNC: 15 MG/DL
CALCIUM SERPL-MCNC: 9.8 MG/DL
CHLORIDE SERPL-SCNC: 107 MMOL/L
CHOLEST SERPL-MCNC: 156 MG/DL
CHOLEST/HDLC SERPL: 2.7 {RATIO}
CO2 SERPL-SCNC: 26 MMOL/L
CREAT SERPL-MCNC: 0.8 MG/DL
EST. GFR  (AFRICAN AMERICAN): >60 ML/MIN/1.73 M^2
EST. GFR  (NON AFRICAN AMERICAN): >60 ML/MIN/1.73 M^2
GLUCOSE SERPL-MCNC: 81 MG/DL
HDLC SERPL-MCNC: 58 MG/DL
HDLC SERPL: 37.2 %
LDLC SERPL CALC-MCNC: 83.6 MG/DL
NONHDLC SERPL-MCNC: 98 MG/DL
POTASSIUM SERPL-SCNC: 4.4 MMOL/L
PROT SERPL-MCNC: 6.6 G/DL
SODIUM SERPL-SCNC: 143 MMOL/L
TRIGL SERPL-MCNC: 72 MG/DL

## 2018-05-21 PROCEDURE — 80053 COMPREHEN METABOLIC PANEL: CPT

## 2018-05-21 PROCEDURE — 80061 LIPID PANEL: CPT

## 2018-05-21 PROCEDURE — 36415 COLL VENOUS BLD VENIPUNCTURE: CPT | Mod: PO

## 2018-05-22 ENCOUNTER — OFFICE VISIT (OUTPATIENT)
Dept: FAMILY MEDICINE | Facility: CLINIC | Age: 43
End: 2018-05-22
Payer: MEDICARE

## 2018-05-22 VITALS
OXYGEN SATURATION: 94 % | HEIGHT: 65 IN | DIASTOLIC BLOOD PRESSURE: 68 MMHG | WEIGHT: 143.19 LBS | RESPIRATION RATE: 18 BRPM | BODY MASS INDEX: 23.86 KG/M2 | SYSTOLIC BLOOD PRESSURE: 116 MMHG | TEMPERATURE: 99 F | HEART RATE: 76 BPM

## 2018-05-22 DIAGNOSIS — M51.36 DEGENERATIVE DISC DISEASE, LUMBAR: ICD-10-CM

## 2018-05-22 DIAGNOSIS — E78.5 HYPERLIPIDEMIA, UNSPECIFIED HYPERLIPIDEMIA TYPE: Primary | ICD-10-CM

## 2018-05-22 DIAGNOSIS — F41.1 GAD (GENERALIZED ANXIETY DISORDER): ICD-10-CM

## 2018-05-22 DIAGNOSIS — Z72.0 TOBACCO ABUSE: ICD-10-CM

## 2018-05-22 DIAGNOSIS — M54.12 CERVICAL RADICULOPATHY: ICD-10-CM

## 2018-05-22 DIAGNOSIS — M54.16 BILATERAL LUMBAR RADICULOPATHY: ICD-10-CM

## 2018-05-22 DIAGNOSIS — Z23 NEED FOR DIPHTHERIA-TETANUS-PERTUSSIS (TDAP) VACCINE: ICD-10-CM

## 2018-05-22 DIAGNOSIS — F33.1 MODERATE EPISODE OF RECURRENT MAJOR DEPRESSIVE DISORDER: ICD-10-CM

## 2018-05-22 DIAGNOSIS — M50.30 DEGENERATIVE DISC DISEASE, CERVICAL: ICD-10-CM

## 2018-05-22 PROCEDURE — 99214 OFFICE O/P EST MOD 30 MIN: CPT | Mod: S$GLB,,, | Performed by: INTERNAL MEDICINE

## 2018-05-22 PROCEDURE — 3008F BODY MASS INDEX DOCD: CPT | Mod: CPTII,S$GLB,, | Performed by: INTERNAL MEDICINE

## 2018-05-22 NOTE — PROGRESS NOTES
Subjective:       Patient ID: Alley Chamorro is a 43 y.o. female.    Medication List with Changes/Refills   New Medications    DIPH,PERTUSS,ACEL,,TET VAC,PF, ADULT (ADACEL) 2 LF-(2.5-5-3-5 MCG)-5LF/0.5 ML SYRG    Inject 0.5 mLs into the muscle once.   Current Medications    ALBUTEROL 90 MCG/ACTUATION INHALER    Inhale 2 puffs into the lungs every 6 (six) hours as needed for Wheezing. Rescue    BACLOFEN (LIORESAL) 20 MG TABLET    Take 20 mg by mouth 3 (three) times daily.    BUSPIRONE (BUSPAR) 5 MG TAB    Take 1 tablet (5 mg total) by mouth 2 (two) times daily as needed.    DULOXETINE (CYMBALTA) 60 MG CAPSULE    Take 1 capsule (60 mg total) by mouth 2 (two) times daily.    GABAPENTIN (NEURONTIN) 300 MG CAPSULE    Take 1 capsule (300 mg total) by mouth 3 (three) times daily.    MELOXICAM (MOBIC) 15 MG TABLET    Take 1 tablet (15 mg total) by mouth once daily.    OLANZAPINE (ZYPREXA) 10 MG TABLET    Take 1 tablet (10 mg total) by mouth every evening.    ROSUVASTATIN (CRESTOR) 40 MG TAB    Take 1 tablet (40 mg total) by mouth every evening.    VARENICLINE (CHANTIX) 1 MG TAB    Take 1 tablet (1 mg total) by mouth 2 (two) times daily.   Discontinued Medications    CLONAZEPAM (KLONOPIN) 0.5 MG TABLET    Take 0.5 mg by mouth.       Chief Complaint: Follow-up  She is here today to f/u on chronic medical issues.     She has hyperlipidemia that is controlled on crestor 40 mg qday. Her most recent lipids were on 5/2018 were 156/72/58/83.      She has anxiety and depression. She is currently taking cymbalta 60 mg bid, buspar 5 mg qday to bid, and zyprexa 10 mg qday. She feels she is doing well. She feels her depression is controlled. No panic attacks. No suicidal ideations.  Referral made to psychiatry but she is on waiting list.  She has been doing well off her klonopin.      She continues to smoke 1 pack a day for over 30 years. She has rx for chantix at home and does want to quit smoking.  She has not yet started taking  treatment.     She continues to have chronic low back and neck pain.  She is now followed by pain management.  She has been evaluated by NS for her low back who did not feel she needed surgery at this time. On 5/15/2018 she had a f/l L4-5 foraminal injection.  The plan is to f/u with pain management in June and if no improvement then return to NS to consider surgery vs spinal cord stimulator.  She is currently taking mobic 15 mg qday, gabapentin 300 mg tid and baclofen 20 mg tid with some pain relief.  She is also noted to have neck pain with tingling down both arms. She says her arms get cold. She reports that she frequently drops things and has frequent headaches.  This prompted an MRI of her cervical spine.  This showed multilevel disc disease with bulging disc causing neural foraminal narrowing.  Her MRI of the lumbar spine showed  At the L4-5 level, there is a disc bulge with superimposed broad shallow central and right paracentral disc protrusion with annular fissure in addition to mild facet joint arthropathy contributing to borderline spinal stenosis.  Also, there is crowding of the lateral recess bilaterally, right greater than left.  This could affect the L5 nerve roots.  There is only mild foraminal stenosis without suspected nerve root compression.  4. At the L5-S1 level, there is a shallow central disc protrusion with subtle annular fissure which does not obviously contact or compress the S1 roots.  There is mild facet joint arthropathy.  There is no spinal stenosis.    Mammogram----2 years  Pap-----GEOFF  Tdap---more than 10 years  Influenza vaccine---1/2018  Pneumovax 23----1/2018    Review of Systems   Constitutional: Negative for appetite change, fatigue, fever and unexpected weight change.   HENT: Negative for congestion, ear pain, hearing loss, sore throat and trouble swallowing.    Eyes: Negative for pain and visual disturbance.   Respiratory: Positive for cough. Negative for chest tightness,  "shortness of breath and wheezing.    Cardiovascular: Negative for chest pain, palpitations and leg swelling.   Gastrointestinal: Negative for abdominal pain, blood in stool, constipation, diarrhea, nausea and vomiting.   Endocrine: Negative for polyuria.   Genitourinary: Negative for dysuria and hematuria.   Musculoskeletal: Positive for arthralgias, back pain and neck pain. Negative for myalgias.   Skin: Negative for rash.   Neurological: Positive for headaches. Negative for dizziness, weakness and numbness.   Hematological: Does not bruise/bleed easily.   Psychiatric/Behavioral: Negative for dysphoric mood, sleep disturbance and suicidal ideas. The patient is not nervous/anxious.        Objective:      Vitals:    05/22/18 0842   BP: 116/68   BP Location: Left arm   Patient Position: Sitting   BP Method: Large (Manual)   Pulse: 76   Resp: 18   Temp: 98.5 °F (36.9 °C)   TempSrc: Oral   SpO2: (!) 94%   Weight: 65 kg (143 lb 3.2 oz)   Height: 5' 5" (1.651 m)     Body mass index is 23.83 kg/m².  Physical Exam    General appearance: No acute distress, cooperative  Mouth: no sores or lesions, moist mucous membranes, poor dentition  Neck: FROM, soft, supple, no thyromegaly, no bruits  Lymph: no anterior or posterior cervical adenopathy  Heart::  Regular rate and rhythm, no murmur  Lung: Clear to ascultation bilaterally, no wheezing, no rales, no rhonchi, no distress  Abdomen: Soft, nontender, no distention, no hepatosplenomegaly, bowel sounds normal, no guarding, no rebound, no peritoneal signs  Skin: no rashes, no lesions  Extremities: no edema, no cyanosis  Neuro: CN 2-12 intact, 5/5 muscle strength upper and lower extremity bilaterally, 3+ DTRs LE bilaterally and 2+ UE DTRs, normal gait, normal sensation  Peripheral pulses: 2+ pedal pulses bilaterally, good perfusion and color  Musculoskeletal: FROM, good strenth, no tenderness  Joint: normal appearance, no swelling, no warmth, no deformity in all joints    Assessment: "       1. Hyperlipidemia, unspecified hyperlipidemia type    2. Moderate episode of recurrent major depressive disorder    3. REMINGTON (generalized anxiety disorder)    4. Tobacco abuse    5. Degenerative disc disease, lumbar    6. Bilateral lumbar radiculopathy    7. Degenerative disc disease, cervical    8. Cervical radiculopathy    9. Need for diphtheria-tetanus-pertussis (Tdap) vaccine        Plan:       Hyperlipidemia, unspecified hyperlipidemia type  Good control on crestor.     Moderate episode of recurrent major depressive disorder  Good control on current regimen. She is tolerating well without any side effects.  She is still trying to establish with psychiatry.     REMINGTON (generalized anxiety disorder)  Improved on her current regimen and doing well on buspar bid PRN.     Tobacco abuse  Strongly advised to stop smoking.  Encouraged to start chantix.     Degenerative disc disease, lumbar with Bilateral lumbar radiculopathy  She is followed by pain management. She recently had a foraminal injection. She is due to f/u in June.      Degenerative disc disease, cervical with Cervical radiculopathy  Recent MRI with finding of disc bulge with narrowing.  To consider checking EMG/NCS. She will f/u with pain in June. She is doing well on her current pain regimen.     Need for diphtheria-tetanus-pertussis (Tdap) vaccine  -     DIPH,PERTUSS,ACEL,,TET VAC,PF, ADULT (ADACEL) 2 Lf-(2.5-5-3-5 mcg)-5Lf/0.5 mL Syrg; Inject 0.5 mLs into the muscle once.  Dispense: 0.5 mL; Refill: 0    Follow-up in about 6 months (around 11/22/2018) for chronic medical issues.

## 2018-06-19 ENCOUNTER — TELEPHONE (OUTPATIENT)
Dept: FAMILY MEDICINE | Facility: CLINIC | Age: 43
End: 2018-06-19

## 2018-06-19 ENCOUNTER — HOSPITAL ENCOUNTER (OUTPATIENT)
Dept: RADIOLOGY | Facility: HOSPITAL | Age: 43
Discharge: HOME | End: 2018-06-19
Attending: INTERNAL MEDICINE
Payer: MEDICARE

## 2018-06-19 ENCOUNTER — OFFICE VISIT (OUTPATIENT)
Dept: PAIN MEDICINE | Facility: CLINIC | Age: 43
End: 2018-06-19
Payer: MEDICARE

## 2018-06-19 VITALS
DIASTOLIC BLOOD PRESSURE: 53 MMHG | TEMPERATURE: 97 F | RESPIRATION RATE: 18 BRPM | HEART RATE: 67 BPM | BODY MASS INDEX: 24.3 KG/M2 | OXYGEN SATURATION: 98 % | SYSTOLIC BLOOD PRESSURE: 111 MMHG | WEIGHT: 146.06 LBS

## 2018-06-19 DIAGNOSIS — M50.30 DDD (DEGENERATIVE DISC DISEASE), CERVICAL: ICD-10-CM

## 2018-06-19 DIAGNOSIS — M51.36 DDD (DEGENERATIVE DISC DISEASE), LUMBAR: Primary | ICD-10-CM

## 2018-06-19 DIAGNOSIS — M79.18 MYOFASCIAL PAIN: ICD-10-CM

## 2018-06-19 DIAGNOSIS — M47.816 LUMBAR SPONDYLOSIS: ICD-10-CM

## 2018-06-19 DIAGNOSIS — Z12.31 ENCOUNTER FOR SCREENING MAMMOGRAM FOR MALIGNANT NEOPLASM OF BREAST: ICD-10-CM

## 2018-06-19 PROCEDURE — 99999 PR PBB SHADOW E&M-EST. PATIENT-LVL IV: CPT | Mod: PBBFAC,,, | Performed by: PHYSICIAN ASSISTANT

## 2018-06-19 PROCEDURE — 77067 SCR MAMMO BI INCL CAD: CPT | Mod: 26,,, | Performed by: RADIOLOGY

## 2018-06-19 PROCEDURE — 3008F BODY MASS INDEX DOCD: CPT | Mod: CPTII,S$GLB,, | Performed by: PHYSICIAN ASSISTANT

## 2018-06-19 PROCEDURE — 99214 OFFICE O/P EST MOD 30 MIN: CPT | Mod: S$GLB,,, | Performed by: PHYSICIAN ASSISTANT

## 2018-06-19 PROCEDURE — 77067 SCR MAMMO BI INCL CAD: CPT | Mod: TC,PO

## 2018-06-19 PROCEDURE — 77063 BREAST TOMOSYNTHESIS BI: CPT | Mod: 26,,, | Performed by: RADIOLOGY

## 2018-06-19 NOTE — TELEPHONE ENCOUNTER
"----- Message from Kate Hayes sent at 6/19/2018 10:48 AM CDT -----  Contact: self  Patient [phone] is calling to ask for a medication for back spasms that she is having that are "really bad"/please call patient to discuss  "

## 2018-06-19 NOTE — TELEPHONE ENCOUNTER
Pt is requesting a muscle relaxer for her back spasms, she saw pain mgt today but said [facility] would only recommend PT and ana rosa not give her an rx. Advised pt that Dr. De La O is out of the office until Thursday and is checking messages throughout the day. Advised pt that if pain gets to bad through the night to report to the ED for relief.

## 2018-06-19 NOTE — PROGRESS NOTES
This note was completed with dictation software and grammatical errors may exist.    CC: Neck pain, headaches, bilateral arm pain and upper back pain, low back pain and right greater than left leg pain    HPI: The patient is a 43-year-old woman with a history of anxiety, tobacco use, chronic neck and back pain who presents in referral from Dr. Renate De La O for continued pain.  She is status post bilateral L4/5 transforaminal epidural steroid injections on 5/15/18 with 0% relief.  The patient is new to me.  She complains of neck pain greater than back pain.  She describes her low back pain as a constant sharp pain located across her low back radiating to the right greater than left posterior thighs.  She reports bilateral neck pain that intermittently radiates to the right greater than left arms.  She states that her pain is worse with any type of activity including sitting.  She only has relief with laying down, ice and heat.  She is taking several medications without significant relief of her pain.  She reports that she has not done physical therapy and many years.  She reports having intermittent weakness and numbness in her right leg and right arm but not on a daily basis.  She denies bladder or bowel incontinence.    Pain intervention history: She reports seeing physicians over the years, including pain management physicians and has undergone numerous different types of injections which sound like epidural steroid injections and radiofrequency ablations, all without much benefit.  She has tried numerous medications including hydrocodone with no relief, gabapentin and Lyrica with no major benefit. She is currently taking Modic 15 mg once daily, baclofen 20 mg 3 times a day neither with much benefit.  She is also taking Cymbalta 60 mg twice a day and had been taking gabapentin 300 mg 3 times a day without much benefit.  She states that she uses marijuana on a daily basis which provides better relief than any  "medication she has had.  She is status post bilateral L4/5 transforaminal epidural steroid injections on 5/15/18 with 0% relief.    ROS: She reports weight gain, headaches, joint stiffness, back pain, difficulty sleeping, anxiety and depression.  Balance of review of systems is negative.    Past Medical History:   Diagnosis Date    Anxiety     Chronic back pain     Depression     Hyperlipidemia     Scoliosis        Past Surgical History:   Procedure Laterality Date    HAND SURGERY Bilateral     HYSTERECTOMY  age 23    endometriosis, left oopherectomy, 2015 right oopherectomy due to cysts    LEG SURGERY Left     congenital deformity done age 18 months, x 2    SHOULDER SURGERY Bilateral     "I could not use my arm", "something was out of place"       Social History     Social History    Marital status: Legally      Spouse name: N/A    Number of children: N/A    Years of education: N/A     Occupational History    disability due to low back      Social History Main Topics    Smoking status: Current Every Day Smoker     Packs/day: 1.00     Years: 30.00     Types: Cigarettes    Smokeless tobacco: Never Used    Alcohol use 1.2 oz/week     2 Shots of liquor per week    Drug use: Yes     Types: Marijuana      Comment: for pain    Sexual activity: Yes     Partners: Male     Other Topics Concern    None     Social History Narrative    None         Medications/Allergies: See med card    Vitals:    06/19/18 0758   BP: (!) 111/53   Pulse: 67   Resp: 18   Temp: 96.9 °F (36.1 °C)   TempSrc: Oral   SpO2: 98%   Weight: 66.3 kg (146 lb 0.9 oz)   PainSc: 10-Worst pain ever   PainLoc: Back         Physical exam:  Gen: A and O x3, pleasant, well-groomed  Skin: No rashes or obvious lesions  HEENT: PERRLA, no obvious deformities on ears or in canals. Trachea midline.  CVS: Regular rate and rhythm, normal palpable pulses.  Resp:No increased work of breathing, symmetrical chest rise.  Abdomen: Soft, " NT/ND.  Musculoskeletal:No antalgic gait.     Neuro:  Upper extremities: 5/5 strength bilaterally  Lower extremities: 5/5 strength bilaterally  Reflexes: Brachioradialis 2+, Bicep 2+, Tricep 2+. Patellar 3+, Achilles 2+ bilaterally.  Sensory:  Intact and symmetrical to light touch and pinprick in C2-T1 dermatomes bilaterally. Intact and symmetrical to light touch and pinprick in L2-S1 dermatomes bilaterally.    Cervical Spine:  Cervical spine: Range of motion is mildly decreased with flexion and extension and lateral rotation with increased pain on extension and oblique extension either side.  Spurling's maneuver causes neck pain to either side.  Myofascial exam:  Moderate tenderness to palpation across cervical paraspinous region bilaterally.    Lumbar spine:  Lumbar spine: Range of motion is mildly reduced with extension with mild increased pain, moderately reduced with flexion with increased pain bilaterally.  Edinson's test causes no increased pain on either side.    Supine straight leg raise is positive for back and leg pain, particularly on the right side at 60° with right posterior thigh pain.  Internal and external rotation of the hip causes no increased pain on either side.  Myofascial exam: Mild tenderness to palpation across lumbar paraspinous muscles.      Imaging:  3/7/18 MRI L-spine  the T11-12 level but there is no spinal canal or foraminal stenosis.  There is no cord compression.  T12-L1: There is no spinal canal or significant foraminal stenosis.  L1-2: There is no spinal canal or significant foraminal stenosis.  L2-3: There is minimal bulging of the annulus.  There is no spinal canal or significant foraminal stenosis.  L3-4: There is minimal bulging of the annulus and mild facet joint arthropathy.  There is no spinal canal or significant foraminal stenosis.  L4-5: The disc is desiccated.  There is a diffuse disc bulge with superimposed broad shallow central and right paracentral disc protrusion  "with annular fissure.  There is mild facet joint arthropathy.  There is borderline spinal stenosis.  There is crowding of the lateral recess bilaterally, right greater than left.  This could affect the L5 roots, right greater than left.  There is mild foraminal stenosis without suspected nerve root compression.  L5-S1: There is mild facet joint arthropathy.  There is a very shallow central disc protrusion with subtle annular fissure which does not obviously contact, displace or compress the S1 roots.  Soft tissues, other: The prevertebral soft tissues are normal.  The aorta is normal in caliber.    3/7/18 Xray L-spine scoliosis survery:  There are 6 non rib bearing vertebral bodies in the lumbar region.  There is a very gentle dextrocurvature.  There is approximately 8-9 degree dextrocurvature of the mid lumbar spine when using superior endplate of L3 and inferior endplate of L5 for reference.  There is a neutral sagittal balance.    From Shaina Contreras note  EMG/ NCV from 4-7-18 did not find evidence of any active radiculopathy.     Per previous records review:  "MRI brain 7/24/15:  Normal  EMG/ NCV upper extremities 7-16-15:  Left carpal tunnel  MRI c-spine 7/24/15:  C5/6 cervical spondylosis with moderate foraminal narrowing  CT myelogram C-spine 7/22/15:  No significant abnormality or foraminal narrowing.  MRI lumbar spine 4-20-16:  L4/5 spondylosis with bilateral foraminal narrowing.  MRI lumbar spine 4-29-16:  No significant abnormality."    1. 4/17/18 EMG: Needle EMG findings are isolated to the right lumbar paraspinal musculature.  This by itself is insufficient for diagnosis of lumbosacral radiculopathy.  This may be indicative of lumbosacral radiculitis, or perhaps early radiculopathy.  Clinical correlation is required. There is insufficient electrodiagnostic evidence for diagnoses of peripheral polyneuropathy, focal mononeuropathy, myopathy, or lumbosacral radiculopathy/plexopathy of the bilateral lower " extremities.    Assessment:  The patient is a 43-year-old woman with a history of anxiety, tobacco use, chronic neck and back pain who presents in referral from Dr. Renate De La O for continued pain.  1. DDD (degenerative disc disease), lumbar  Ambulatory Referral to Physical/Occupational Therapy   2. Lumbar spondylosis  Ambulatory Referral to Physical/Occupational Therapy   3. DDD (degenerative disc disease), cervical  Ambulatory Referral to Physical/Occupational Therapy   4. Myofascial pain  Ambulatory Referral to Physical/Occupational Therapy       Plan:  1.  The patient did not have any relief following the bilateral L4/5 TF ILA's.  We discussed that she may have a large muscular component to her pain.  She has seen neurosurgery and an operation was not recommended.  I completed orders for physical therapy at [facility] in [place].  She has not done physical therapy in many years.  I discussed her case with Dr. Maynard and we will hold off on further interventional procedures for her lumbar spine for now.  She likely isn't a great candidate for spinal cord stimulation.  2.  I reviewed her cervical spine MRI results which revealed multilevel degenerative changes.  She reports having procedures for her neck in the past without relief.  We discussed that her physical therapist will work on the cervical and lumbar spine.  3.  We discussed her medications and she denies having major relief with what she is taking or anything else she has taken in the past.  She is on meloxicam, gabapentin, duloxetine and baclofen.  She requested a muscle relaxer but is already taking baclofen 20 mg 3 times a day, stated other muscle relaxers have not helped her in the past.  We will hold off on making any medication changes.  She is not a good candidate for opioids.  She smokes marijuana on a daily basis.  4.  Follow-up in 2 months or sooner as needed.

## 2018-06-22 RX ORDER — TIZANIDINE 4 MG/1
4 TABLET ORAL EVERY 8 HOURS
Qty: 45 TABLET | Refills: 2 | Status: SHIPPED | OUTPATIENT
Start: 2018-06-22 | End: 2019-01-21 | Stop reason: SDUPTHER

## 2018-06-22 NOTE — TELEPHONE ENCOUNTER
It looks like she is already taking baclofen.  I am happy to try tizanidine but she would have to stop baclofen.     Please let me know what she wants to do.     thanks

## 2018-06-22 NOTE — TELEPHONE ENCOUNTER
Please advise her to stop baclofen    Okay to use tizanidine every 8 hrs as needed. I would only use when needed and not scheduled.     Thanks

## 2018-06-27 ENCOUNTER — TELEPHONE (OUTPATIENT)
Dept: FAMILY MEDICINE | Facility: CLINIC | Age: 43
End: 2018-06-27

## 2018-06-27 DIAGNOSIS — R92.8 ABNORMAL MAMMOGRAM: Primary | ICD-10-CM

## 2018-06-27 NOTE — TELEPHONE ENCOUNTER
Please let her know that radiology requested further imaging of her breasts.  Please schedule diagnostic mammogram and ultrasound b/l.     thanks

## 2018-07-09 ENCOUNTER — HOSPITAL ENCOUNTER (OUTPATIENT)
Dept: RADIOLOGY | Facility: HOSPITAL | Age: 43
Discharge: HOME | End: 2018-07-09
Attending: INTERNAL MEDICINE
Payer: MEDICARE

## 2018-07-09 DIAGNOSIS — R92.8 ABNORMAL MAMMOGRAM: ICD-10-CM

## 2018-07-09 PROCEDURE — 77062 BREAST TOMOSYNTHESIS BI: CPT | Mod: TC,PO

## 2018-07-09 PROCEDURE — 77066 DX MAMMO INCL CAD BI: CPT | Mod: 26,,, | Performed by: RADIOLOGY

## 2018-07-09 PROCEDURE — 77062 BREAST TOMOSYNTHESIS BI: CPT | Mod: 26,,, | Performed by: RADIOLOGY

## 2018-07-09 PROCEDURE — 76642 ULTRASOUND BREAST LIMITED: CPT | Mod: 26,LT,, | Performed by: RADIOLOGY

## 2018-07-09 PROCEDURE — 76642 ULTRASOUND BREAST LIMITED: CPT | Mod: TC,PO,LT

## 2018-07-10 ENCOUNTER — PATIENT MESSAGE (OUTPATIENT)
Dept: FAMILY MEDICINE | Facility: CLINIC | Age: 43
End: 2018-07-10

## 2018-08-21 ENCOUNTER — OFFICE VISIT (OUTPATIENT)
Dept: PAIN MEDICINE | Facility: CLINIC | Age: 43
End: 2018-08-21
Payer: MEDICARE

## 2018-08-21 VITALS
HEART RATE: 71 BPM | OXYGEN SATURATION: 97 % | RESPIRATION RATE: 18 BRPM | BODY MASS INDEX: 24.74 KG/M2 | TEMPERATURE: 99 F | DIASTOLIC BLOOD PRESSURE: 64 MMHG | WEIGHT: 148.69 LBS | SYSTOLIC BLOOD PRESSURE: 103 MMHG

## 2018-08-21 DIAGNOSIS — M47.816 LUMBAR SPONDYLOSIS: ICD-10-CM

## 2018-08-21 DIAGNOSIS — M79.18 MYOFASCIAL PAIN: Primary | ICD-10-CM

## 2018-08-21 DIAGNOSIS — M54.2 NECK PAIN: ICD-10-CM

## 2018-08-21 PROCEDURE — 99213 OFFICE O/P EST LOW 20 MIN: CPT | Mod: S$GLB,,, | Performed by: ANESTHESIOLOGY

## 2018-08-21 PROCEDURE — 3008F BODY MASS INDEX DOCD: CPT | Mod: CPTII,S$GLB,, | Performed by: ANESTHESIOLOGY

## 2018-08-21 PROCEDURE — 99999 PR PBB SHADOW E&M-EST. PATIENT-LVL III: CPT | Mod: PBBFAC,,, | Performed by: ANESTHESIOLOGY

## 2018-08-21 NOTE — PROGRESS NOTES
This note was completed with dictation software and grammatical errors may exist.    CC: Neck pain, headaches, bilateral arm pain and upper back pain, low back pain and right greater than left leg pain    HPI: The patient is a 43-year-old woman with a history of anxiety, tobacco use, chronic neck and back pain who presents in referral from Dr. Renate De La O for continued pain. She returns in follow-up today, the last time we had seen her was about 2 months ago and during the interim she has been attending physical therapy at [facility] in [place] and reports about 50% relief of her back pain and neck pain.  She has been doing lower extremity strengthening, core strengthening, stretching and reports that most significant, she has much less muscle spasms in her back.  She denies any new numbness or weakness in the arms or legs, no bowel or bladder incontinence.  She continues to take Mobic, baclofen and Cymbalta through primary care.  She states that she is moving back to [place] because her  lost his job here but he really gain his job there.  She is moving at the end of the month.        Pain intervention history: She reports seeing physicians over the years, including pain management physicians and has undergone numerous different types of injections which sound like epidural steroid injections and radiofrequency ablations, all without much benefit.  She has tried numerous medications including hydrocodone with no relief, gabapentin and Lyrica with no major benefit. She is currently taking Mobic 15 mg once daily, baclofen 20 mg 3 times a day neither with much benefit.  She is also taking Cymbalta 60 mg twice a day and had been taking gabapentin 300 mg 3 times a day without much benefit.  She states that she uses marijuana on a daily basis which provides better relief than any medication she has had.  She is status post bilateral L4/5 transforaminal epidural steroid injections on 5/15/18 with 0% relief.    ROS: She  "reports weight gain, headaches, joint stiffness, back pain, difficulty sleeping, anxiety and depression.  Balance of review of systems is negative.    Past Medical History:   Diagnosis Date    Anxiety     Chronic back pain     Depression     Hyperlipidemia     Scoliosis        Past Surgical History:   Procedure Laterality Date    HAND SURGERY Bilateral     HYSTERECTOMY  age 23    endometriosis, left oopherectomy, 2015 right oopherectomy due to cysts    LEG SURGERY Left     congenital deformity done age 18 months, x 2    SHOULDER SURGERY Bilateral     "I could not use my arm", "something was out of place"       Social History     Socioeconomic History    Marital status: Legally      Spouse name: None    Number of children: None    Years of education: None    Highest education level: None   Social Needs    Financial resource strain: None    Food insecurity - worry: None    Food insecurity - inability: None    Transportation needs - medical: None    Transportation needs - non-medical: None   Occupational History    Occupation: disability due to low back   Tobacco Use    Smoking status: Current Every Day Smoker     Packs/day: 1.00     Years: 30.00     Pack years: 30.00     Types: Cigarettes    Smokeless tobacco: Never Used   Substance and Sexual Activity    Alcohol use: Yes     Alcohol/week: 1.2 oz     Types: 2 Shots of liquor per week    Drug use: Yes     Types: Marijuana     Comment: for pain    Sexual activity: Yes     Partners: Male   Other Topics Concern    None   Social History Narrative    None         Medications/Allergies: See med card    Vitals:    08/21/18 0955   BP: 103/64   Pulse: 71   Resp: 18   Temp: 98.5 °F (36.9 °C)   TempSrc: Oral   SpO2: 97%   Weight: 67.4 kg (148 lb 11.2 oz)   PainSc:   8   PainLoc: Back         Physical exam:  Gen: A and O x3, pleasant, well-groomed  Skin: No rashes or obvious lesions  HEENT: PERRLA, no obvious deformities on ears or in canals. " Trachea midline.  CVS: Regular rate and rhythm, normal palpable pulses.  Resp:No increased work of breathing, symmetrical chest rise.  Abdomen: Soft, NT/ND.  Musculoskeletal:No antalgic gait.     Neuro:  Upper extremities: 5/5 strength bilaterally  Lower extremities: 5/5 strength bilaterally  Reflexes: Brachioradialis 2+, Bicep 2+, Tricep 2+. Patellar 3+, Achilles 2+ bilaterally.  Sensory:  Intact and symmetrical to light touch and pinprick in C2-T1 dermatomes bilaterally. Intact and symmetrical to light touch and pinprick in L2-S1 dermatomes bilaterally.    Cervical Spine:  Cervical spine: Range of motion is mildly decreased with flexion and extension and lateral rotation with increased pain on extension and oblique extension either side.  Spurling's maneuver causes neck pain to either side.  Myofascial exam:  Moderate tenderness to palpation across cervical paraspinous region bilaterally.    Lumbar spine:  Lumbar spine: Range of motion is mildly reduced with extension with mild increased pain, moderately reduced with flexion with increased pain bilaterally.  Edinson's test causes no increased pain on either side.    Supine straight leg raise is positive for back and leg pain, particularly on the right side at 60° with right posterior thigh pain.  Internal and external rotation of the hip causes no increased pain on either side.  Myofascial exam: Mild tenderness to palpation across lumbar paraspinous muscles.      Imaging:  3/7/18 MRI L-spine  the T11-12 level but there is no spinal canal or foraminal stenosis.  There is no cord compression.  T12-L1: There is no spinal canal or significant foraminal stenosis.  L1-2: There is no spinal canal or significant foraminal stenosis.  L2-3: There is minimal bulging of the annulus.  There is no spinal canal or significant foraminal stenosis.  L3-4: There is minimal bulging of the annulus and mild facet joint arthropathy.  There is no spinal canal or significant foraminal  "stenosis.  L4-5: The disc is desiccated.  There is a diffuse disc bulge with superimposed broad shallow central and right paracentral disc protrusion with annular fissure.  There is mild facet joint arthropathy.  There is borderline spinal stenosis.  There is crowding of the lateral recess bilaterally, right greater than left.  This could affect the L5 roots, right greater than left.  There is mild foraminal stenosis without suspected nerve root compression.  L5-S1: There is mild facet joint arthropathy.  There is a very shallow central disc protrusion with subtle annular fissure which does not obviously contact, displace or compress the S1 roots.  Soft tissues, other: The prevertebral soft tissues are normal.  The aorta is normal in caliber.    3/7/18 Xray L-spine scoliosis survery:  There are 6 non rib bearing vertebral bodies in the lumbar region.  There is a very gentle dextrocurvature.  There is approximately 8-9 degree dextrocurvature of the mid lumbar spine when using superior endplate of L3 and inferior endplate of L5 for reference.  There is a neutral sagittal balance.    From Shaina Contreras note  EMG/ NCV from 4-7-18 did not find evidence of any active radiculopathy.     Per previous records review:  "MRI brain 7/24/15:  Normal  EMG/ NCV upper extremities 7-16-15:  Left carpal tunnel  MRI c-spine 7/24/15:  C5/6 cervical spondylosis with moderate foraminal narrowing  CT myelogram C-spine 7/22/15:  No significant abnormality or foraminal narrowing.  MRI lumbar spine 4-20-16:  L4/5 spondylosis with bilateral foraminal narrowing.  MRI lumbar spine 4-29-16:  No significant abnormality."    1. 4/17/18 EMG: Needle EMG findings are isolated to the right lumbar paraspinal musculature.  This by itself is insufficient for diagnosis of lumbosacral radiculopathy.  This may be indicative of lumbosacral radiculitis, or perhaps early radiculopathy.  Clinical correlation is required. There is insufficient electrodiagnostic " evidence for diagnoses of peripheral polyneuropathy, focal mononeuropathy, myopathy, or lumbosacral radiculopathy/plexopathy of the bilateral lower extremities.    Assessment:  The patient is a 43-year-old woman with a history of anxiety, tobacco use, chronic neck and back pain who presents in referral from Dr. Renate De La O for continued pain.  1. Myofascial pain     2. Lumbar spondylosis     3. Neck pain         Plan:  1.  Fortunately she has had some relief with physical therapy and I explained that I think much of her pain is going to improve if she can continue these exercises on a long-term basis. I stressed the importance of doing these exercises in a gym, I have given her other handouts on core strengthening, stretching because I would like her to continue these even before she gets into another physical therapy setting in [place].  I told her that she needs to continue these otherwise she may regress during the move.  I told her to contact me if she needs anything in the meantime, she is going to get set up with her pain management physician that she previously had seen in [place].

## 2018-08-23 DIAGNOSIS — M54.42 CHRONIC BILATERAL LOW BACK PAIN WITH BILATERAL SCIATICA: ICD-10-CM

## 2018-08-23 DIAGNOSIS — G89.29 CHRONIC BILATERAL LOW BACK PAIN WITH BILATERAL SCIATICA: ICD-10-CM

## 2018-08-23 DIAGNOSIS — M54.41 CHRONIC BILATERAL LOW BACK PAIN WITH BILATERAL SCIATICA: ICD-10-CM

## 2018-08-23 RX ORDER — DULOXETIN HYDROCHLORIDE 60 MG/1
60 CAPSULE, DELAYED RELEASE ORAL 2 TIMES DAILY
Qty: 60 CAPSULE | Refills: 3 | Status: SHIPPED | OUTPATIENT
Start: 2018-08-23 | End: 2019-03-16 | Stop reason: SDUPTHER

## 2018-10-10 DIAGNOSIS — G89.29 CHRONIC BILATERAL LOW BACK PAIN WITH BILATERAL SCIATICA: ICD-10-CM

## 2018-10-10 DIAGNOSIS — M54.42 CHRONIC BILATERAL LOW BACK PAIN WITH BILATERAL SCIATICA: ICD-10-CM

## 2018-10-10 DIAGNOSIS — M54.41 CHRONIC BILATERAL LOW BACK PAIN WITH BILATERAL SCIATICA: ICD-10-CM

## 2018-10-10 NOTE — TELEPHONE ENCOUNTER
----- Message from Suzie Bennett sent at 10/10/2018  2:05 PM CDT -----  Contact: [facility]   Type:  RX Refill Request    Who Called:  [facility]   Refill or New Rx:  Refill  RX Name and Strength:  meloxicam (MOBIC) 15 MG tablet  How is the patient currently taking it? (ex. 1XDay):  Take 1 tablet (15 mg total) by mouth once daily  Is this a 30 day or 90 day RX:  30    Preferred Pharmacy with phone number:    [facility] [place]  [address]        Local or Mail Order:  Out of town   Ordering Provider: Jairon Farias Call Back Number:  [phone] (home)     Additional Information:

## 2018-10-11 RX ORDER — MELOXICAM 15 MG/1
15 TABLET ORAL DAILY
Qty: 90 TABLET | Refills: 2 | Status: SHIPPED | OUTPATIENT
Start: 2018-10-11 | End: 2019-07-06 | Stop reason: SDUPTHER

## 2018-11-13 ENCOUNTER — PATIENT MESSAGE (OUTPATIENT)
Dept: FAMILY MEDICINE | Facility: CLINIC | Age: 43
End: 2018-11-13

## 2019-01-21 RX ORDER — TIZANIDINE 4 MG/1
4 TABLET ORAL EVERY 8 HOURS
Qty: 45 TABLET | Refills: 2 | Status: SHIPPED | OUTPATIENT
Start: 2019-01-21 | End: 2019-01-31

## 2019-03-16 DIAGNOSIS — M54.41 CHRONIC BILATERAL LOW BACK PAIN WITH BILATERAL SCIATICA: ICD-10-CM

## 2019-03-16 DIAGNOSIS — M54.42 CHRONIC BILATERAL LOW BACK PAIN WITH BILATERAL SCIATICA: ICD-10-CM

## 2019-03-16 DIAGNOSIS — G89.29 CHRONIC BILATERAL LOW BACK PAIN WITH BILATERAL SCIATICA: ICD-10-CM

## 2019-03-16 RX ORDER — DULOXETIN HYDROCHLORIDE 60 MG/1
CAPSULE, DELAYED RELEASE ORAL
Qty: 60 CAPSULE | Refills: 3 | Status: SHIPPED | OUTPATIENT
Start: 2019-03-16

## 2019-04-20 DIAGNOSIS — F33.1 MODERATE EPISODE OF RECURRENT MAJOR DEPRESSIVE DISORDER: ICD-10-CM

## 2019-04-22 RX ORDER — OLANZAPINE 10 MG/1
TABLET ORAL
Qty: 30 TABLET | Refills: 0 | Status: SHIPPED | OUTPATIENT
Start: 2019-04-22 | End: 2019-07-05 | Stop reason: SDUPTHER

## 2019-04-22 NOTE — TELEPHONE ENCOUNTER
Spoke to pt. She is no longer in [place]. Spoke to pharmacy. Asked them to take our name off her refills.

## 2019-07-05 ENCOUNTER — TELEPHONE (OUTPATIENT)
Dept: FAMILY MEDICINE | Facility: CLINIC | Age: 44
End: 2019-07-05

## 2019-07-05 DIAGNOSIS — F33.1 MODERATE EPISODE OF RECURRENT MAJOR DEPRESSIVE DISORDER: ICD-10-CM

## 2019-07-05 RX ORDER — OLANZAPINE 10 MG/1
TABLET ORAL
Qty: 30 TABLET | Refills: 0 | Status: SHIPPED | OUTPATIENT
Start: 2019-07-05

## 2019-07-06 DIAGNOSIS — M54.42 CHRONIC BILATERAL LOW BACK PAIN WITH BILATERAL SCIATICA: ICD-10-CM

## 2019-07-06 DIAGNOSIS — M54.41 CHRONIC BILATERAL LOW BACK PAIN WITH BILATERAL SCIATICA: ICD-10-CM

## 2019-07-06 DIAGNOSIS — G89.29 CHRONIC BILATERAL LOW BACK PAIN WITH BILATERAL SCIATICA: ICD-10-CM

## 2019-07-07 RX ORDER — MELOXICAM 15 MG/1
TABLET ORAL
Qty: 90 TABLET | Refills: 0 | Status: SHIPPED | OUTPATIENT
Start: 2019-07-07

## 2019-09-17 DIAGNOSIS — F41.1 GAD (GENERALIZED ANXIETY DISORDER): ICD-10-CM

## 2019-09-19 RX ORDER — BUSPIRONE HYDROCHLORIDE 5 MG/1
TABLET ORAL
Qty: 60 TABLET | Refills: 11 | OUTPATIENT
Start: 2019-09-19

## 2019-12-03 DIAGNOSIS — M54.41 CHRONIC BILATERAL LOW BACK PAIN WITH BILATERAL SCIATICA: ICD-10-CM

## 2019-12-03 DIAGNOSIS — M54.42 CHRONIC BILATERAL LOW BACK PAIN WITH BILATERAL SCIATICA: ICD-10-CM

## 2019-12-03 DIAGNOSIS — G89.29 CHRONIC BILATERAL LOW BACK PAIN WITH BILATERAL SCIATICA: ICD-10-CM

## 2019-12-03 RX ORDER — MELOXICAM 15 MG/1
TABLET ORAL
Qty: 90 TABLET | Refills: 0 | OUTPATIENT
Start: 2019-12-03

## 2019-12-18 DIAGNOSIS — M54.41 CHRONIC BILATERAL LOW BACK PAIN WITH BILATERAL SCIATICA: ICD-10-CM

## 2019-12-18 DIAGNOSIS — G89.29 CHRONIC BILATERAL LOW BACK PAIN WITH BILATERAL SCIATICA: ICD-10-CM

## 2019-12-18 DIAGNOSIS — M54.42 CHRONIC BILATERAL LOW BACK PAIN WITH BILATERAL SCIATICA: ICD-10-CM

## 2019-12-19 RX ORDER — DULOXETIN HYDROCHLORIDE 60 MG/1
CAPSULE, DELAYED RELEASE ORAL
Qty: 60 CAPSULE | Refills: 3 | OUTPATIENT
Start: 2019-12-19

## (undated) DEVICE — APPLICATOR CHLORAPREP CLR 10.5

## (undated) DEVICE — SEE MEDLINE ITEM 152622

## (undated) DEVICE — MARKER SKIN STND TIP BLUE BARR

## (undated) DEVICE — NDL SPINAL SPINOCAN 22GX3.5

## (undated) DEVICE — TRAY NERVE BLOCK

## (undated) DEVICE — GLOVE SURGICAL LATEX SZ 7